# Patient Record
Sex: MALE | Race: WHITE | NOT HISPANIC OR LATINO | Employment: FULL TIME | URBAN - METROPOLITAN AREA
[De-identification: names, ages, dates, MRNs, and addresses within clinical notes are randomized per-mention and may not be internally consistent; named-entity substitution may affect disease eponyms.]

---

## 2017-06-29 ENCOUNTER — OFFICE VISIT (OUTPATIENT)
Dept: INTERNAL MEDICINE | Facility: CLINIC | Age: 52
End: 2017-06-29

## 2017-06-29 VITALS
DIASTOLIC BLOOD PRESSURE: 84 MMHG | TEMPERATURE: 98.6 F | BODY MASS INDEX: 28.1 KG/M2 | HEIGHT: 73 IN | HEART RATE: 51 BPM | OXYGEN SATURATION: 98 % | WEIGHT: 212 LBS | SYSTOLIC BLOOD PRESSURE: 124 MMHG

## 2017-06-29 DIAGNOSIS — J06.9 ACUTE URI: Primary | ICD-10-CM

## 2017-06-29 PROCEDURE — 99213 OFFICE O/P EST LOW 20 MIN: CPT | Performed by: NURSE PRACTITIONER

## 2017-06-29 RX ORDER — CEFDINIR 300 MG/1
300 CAPSULE ORAL 2 TIMES DAILY
Qty: 14 CAPSULE | Refills: 0 | Status: SHIPPED | OUTPATIENT
Start: 2017-06-29 | End: 2019-01-10 | Stop reason: SDUPTHER

## 2017-06-29 RX ORDER — GUAIFENESIN 600 MG/1
1200 TABLET, EXTENDED RELEASE ORAL 2 TIMES DAILY
Qty: 14 TABLET
Start: 2017-06-29 | End: 2019-01-10 | Stop reason: SDUPTHER

## 2017-06-29 RX ORDER — LORATADINE 10 MG/1
10 TABLET ORAL DAILY
Qty: 10 TABLET
Start: 2017-06-29 | End: 2019-01-10 | Stop reason: SDUPTHER

## 2017-06-29 NOTE — PROGRESS NOTES
Subjective   Amadeo Leary is a 51 y.o. male who presents due to respiratory symptoms.    URI    This is a new problem. The current episode started 1 to 4 weeks ago. The problem has been gradually worsening. There has been no fever. Associated symptoms include congestion, coughing, headaches, rhinorrhea and a sore throat. Pertinent negatives include no abdominal pain, chest pain, diarrhea, ear pain, nausea, neck pain, sneezing, swollen glands, vomiting or wheezing. Treatments tried: started Mucinex 600mg bid. The treatment provided mild relief.        The following portions of the patient's history were reviewed and updated as appropriate: allergies, current medications, past social history and problem list.    Past Medical History:   Diagnosis Date   • Seasonal allergies          Current Outpatient Prescriptions:   •  cefdinir (OMNICEF) 300 MG capsule, Take 1 capsule by mouth 2 (Two) Times a Day for 7 days., Disp: 14 capsule, Rfl: 0  •  guaiFENesin (MUCINEX) 600 MG 12 hr tablet, Take 2 tablets by mouth 2 (Two) Times a Day for 7 days., Disp: 14 tablet, Rfl:   •  loratadine (CLARITIN) 10 MG tablet, Take 1 tablet by mouth Daily for 10 days., Disp: 10 tablet, Rfl:     Allergies   Allergen Reactions   • Levaquin [Levofloxacin In D5w] Itching       Review of Systems   Constitutional: Negative for appetite change, chills, fatigue and fever.   HENT: Positive for congestion, rhinorrhea and sore throat. Negative for ear discharge, ear pain, facial swelling, hearing loss, postnasal drip, sinus pressure, sneezing and tinnitus.    Respiratory: Positive for cough. Negative for chest tightness, shortness of breath and wheezing.    Cardiovascular: Negative for chest pain, palpitations and leg swelling.   Gastrointestinal: Negative for abdominal pain, diarrhea, nausea and vomiting.   Musculoskeletal: Negative for neck pain and neck stiffness.   Neurological: Positive for headaches.   Hematological: Negative for adenopathy.  "      Objective   Vitals:    06/29/17 1517   BP: 124/84   BP Location: Left arm   Patient Position: Sitting   Cuff Size: Adult   Pulse: 51   Temp: 98.6 °F (37 °C)   TempSrc: Oral   SpO2: 98%   Weight: 212 lb (96.2 kg)   Height: 73\" (185.4 cm)     Physical Exam   Constitutional: He appears well-developed and well-nourished. He is cooperative. He does not have a sickly appearance. He does not appear ill.   HENT:   Head: Normocephalic.   Right Ear: Hearing, tympanic membrane and external ear normal. No drainage, swelling or tenderness. Tympanic membrane is not injected, not erythematous and not bulging. No middle ear effusion.   Left Ear: Hearing, tympanic membrane and external ear normal. No drainage, swelling or tenderness. Tympanic membrane is not injected, not erythematous and not bulging.  No middle ear effusion.   Nose: Nose normal. No mucosal edema, rhinorrhea or sinus tenderness. Right sinus exhibits no maxillary sinus tenderness and no frontal sinus tenderness. Left sinus exhibits no maxillary sinus tenderness and no frontal sinus tenderness.   Mouth/Throat: Mucous membranes are normal. Posterior oropharyngeal erythema present.   Eyes: Conjunctivae and lids are normal. Right eye exhibits no discharge and no exudate. Left eye exhibits no discharge and no exudate.   Neck: Trachea normal and normal range of motion. Edema present.   Cardiovascular: Regular rhythm, normal heart sounds and normal pulses.    No murmur heard.  Pulmonary/Chest: Breath sounds normal. No respiratory distress. He has no decreased breath sounds. He has no wheezes. He has no rhonchi. He has no rales.   Lymphadenopathy:     He has no cervical adenopathy.   Neurological: He is alert.   Skin: Skin is warm, dry and intact.   Nursing note and vitals reviewed.      Assessment/Plan   Amadeo was seen today for uri.    Diagnoses and all orders for this visit:    Acute URI  -     guaiFENesin (MUCINEX) 600 MG 12 hr tablet; Take 2 tablets by mouth 2 " (Two) Times a Day for 7 days.  -     loratadine (CLARITIN) 10 MG tablet; Take 1 tablet by mouth Daily for 10 days.  -     cefdinir (OMNICEF) 300 MG capsule; Take 1 capsule by mouth 2 (Two) Times a Day for 7 days.

## 2019-01-10 ENCOUNTER — OFFICE VISIT (OUTPATIENT)
Dept: INTERNAL MEDICINE | Facility: CLINIC | Age: 54
End: 2019-01-10

## 2019-01-10 VITALS
TEMPERATURE: 98.5 F | BODY MASS INDEX: 28.89 KG/M2 | HEART RATE: 60 BPM | OXYGEN SATURATION: 97 % | SYSTOLIC BLOOD PRESSURE: 110 MMHG | WEIGHT: 219 LBS | DIASTOLIC BLOOD PRESSURE: 78 MMHG

## 2019-01-10 DIAGNOSIS — J06.9 ACUTE URI: Primary | ICD-10-CM

## 2019-01-10 PROCEDURE — 99213 OFFICE O/P EST LOW 20 MIN: CPT | Performed by: NURSE PRACTITIONER

## 2019-01-10 RX ORDER — LORATADINE 10 MG/1
10 TABLET ORAL DAILY
Qty: 10 TABLET
Start: 2019-01-10 | End: 2019-01-20

## 2019-01-10 RX ORDER — CEFDINIR 300 MG/1
300 CAPSULE ORAL 2 TIMES DAILY
Qty: 14 CAPSULE | Refills: 0 | Status: SHIPPED | OUTPATIENT
Start: 2019-01-10 | End: 2019-01-17

## 2019-01-10 RX ORDER — GUAIFENESIN 600 MG/1
1200 TABLET, EXTENDED RELEASE ORAL 2 TIMES DAILY
Qty: 14 TABLET
Start: 2019-01-10 | End: 2019-01-17

## 2019-01-10 NOTE — PROGRESS NOTES
Subjective   Amadeo Leary is a 53 y.o. male who presents due to respiratory symptoms.    URI    This is a new problem. The current episode started in the past 7 days. The problem has been gradually worsening. Maximum temperature: felt feverish, ?low grade. Associated symptoms include congestion, coughing (productive of sputum), headaches, nausea, rhinorrhea, sneezing and a sore throat. Pertinent negatives include no abdominal pain, chest pain, diarrhea, dysuria, ear pain, vomiting or wheezing. Treatments tried: Mucinex DM, Nyquil. The treatment provided mild relief.        The following portions of the patient's history were reviewed and updated as appropriate: allergies, current medications, past social history and problem list.    Past Medical History:   Diagnosis Date   • Seasonal allergies          Current Outpatient Medications:   •  cefdinir (OMNICEF) 300 MG capsule, Take 1 capsule by mouth 2 (Two) Times a Day for 7 days., Disp: 14 capsule, Rfl: 0  •  guaiFENesin (MUCINEX) 600 MG 12 hr tablet, Take 2 tablets by mouth 2 (Two) Times a Day for 7 days., Disp: 14 tablet, Rfl:   •  loratadine (CLARITIN) 10 MG tablet, Take 1 tablet by mouth Daily for 10 days., Disp: 10 tablet, Rfl:     Allergies   Allergen Reactions   • Levaquin [Levofloxacin In D5w] Itching       Review of Systems   Constitutional: Positive for fatigue. Negative for activity change, appetite change, chills, fever and unexpected weight change.   HENT: Positive for congestion, postnasal drip, rhinorrhea, sinus pressure, sneezing and sore throat. Negative for drooling, ear pain, facial swelling, hearing loss, mouth sores, nosebleeds, trouble swallowing and voice change.    Eyes: Negative for pain, discharge, itching and visual disturbance.   Respiratory: Positive for cough (productive of sputum) and chest tightness. Negative for choking, shortness of breath and wheezing.    Cardiovascular: Negative for chest pain and palpitations.    Gastrointestinal: Positive for nausea. Negative for abdominal pain, constipation, diarrhea and vomiting.   Endocrine: Negative for polyuria.   Genitourinary: Negative for dysuria and frequency.   Musculoskeletal: Negative for back pain and joint swelling.   Neurological: Positive for headaches.       Objective   Vitals:    01/10/19 0827   BP: 110/78   BP Location: Left arm   Patient Position: Sitting   Cuff Size: Adult   Pulse: 60   Temp: 98.5 °F (36.9 °C)   TempSrc: Oral   SpO2: 97%   Weight: 99.3 kg (219 lb)     Physical Exam   Constitutional: He appears well-developed and well-nourished. He is cooperative. He does not have a sickly appearance. He does not appear ill.   HENT:   Head: Normocephalic.   Right Ear: Hearing, tympanic membrane and external ear normal. No drainage, swelling or tenderness. Tympanic membrane is not injected, not erythematous and not bulging. No middle ear effusion.   Left Ear: Hearing, tympanic membrane and external ear normal. No drainage, swelling or tenderness. Tympanic membrane is not injected, not erythematous and not bulging.  No middle ear effusion.   Nose: Nose normal. No mucosal edema, rhinorrhea or sinus tenderness. Right sinus exhibits no maxillary sinus tenderness and no frontal sinus tenderness. Left sinus exhibits no maxillary sinus tenderness and no frontal sinus tenderness.   Mouth/Throat: Mucous membranes are normal. Posterior oropharyngeal erythema present.   Eyes: Conjunctivae and lids are normal. Right eye exhibits no discharge and no exudate. Left eye exhibits no discharge and no exudate.   Neck: Trachea normal and normal range of motion. Edema present.   Cardiovascular: Regular rhythm, normal heart sounds and normal pulses.   No murmur heard.  Pulmonary/Chest: Breath sounds normal. No respiratory distress. He has no decreased breath sounds. He has no wheezes. He has no rhonchi. He has no rales.   Lymphadenopathy:     He has no cervical adenopathy.   Neurological: He  is alert.   Skin: Skin is warm, dry and intact.   Nursing note and vitals reviewed.      Assessment/Plan   Amadeo was seen today for uri.    Diagnoses and all orders for this visit:    Acute URI  -     cefdinir (OMNICEF) 300 MG capsule; Take 1 capsule by mouth 2 (Two) Times a Day for 7 days.  -     guaiFENesin (MUCINEX) 600 MG 12 hr tablet; Take 2 tablets by mouth 2 (Two) Times a Day for 7 days.  -     loratadine (CLARITIN) 10 MG tablet; Take 1 tablet by mouth Daily for 10 days.

## 2019-02-04 ENCOUNTER — TELEPHONE (OUTPATIENT)
Dept: INTERNAL MEDICINE | Facility: CLINIC | Age: 54
End: 2019-02-04

## 2019-02-04 RX ORDER — AZITHROMYCIN 250 MG/1
TABLET, FILM COATED ORAL
Qty: 6 TABLET | Refills: 0 | Status: SHIPPED | OUTPATIENT
Start: 2019-02-04 | End: 2019-03-18

## 2019-02-04 NOTE — TELEPHONE ENCOUNTER
Okay to call in Zpak #1 as directed with no refills, advised to f/u in clinic if sx persist/worsen. Thanks.

## 2019-02-04 NOTE — TELEPHONE ENCOUNTER
----- Message from Ethel Ojeda sent at 2/4/2019  9:08 AM EST -----  Contact: pt - Dr Houston' pt - RE: new Rx refills  Pt calling and would like a return call regarding seen a couple of weeks ago for Bronchitis. He informs antibiotic helped a couple of weeks ago but informs symptoms have returned. He would like to know if Rx for antibiotic could be called to pharmacy? Please advise. Thanks      [id number] [address] [place] [phone] [facility] [phone]     Pt # [phone]

## 2019-03-18 ENCOUNTER — OFFICE VISIT (OUTPATIENT)
Dept: INTERNAL MEDICINE | Facility: CLINIC | Age: 54
End: 2019-03-18

## 2019-03-18 VITALS
OXYGEN SATURATION: 98 % | WEIGHT: 219 LBS | HEART RATE: 57 BPM | BODY MASS INDEX: 28.89 KG/M2 | DIASTOLIC BLOOD PRESSURE: 74 MMHG | TEMPERATURE: 98.9 F | SYSTOLIC BLOOD PRESSURE: 100 MMHG

## 2019-03-18 DIAGNOSIS — R05.9 COUGH: ICD-10-CM

## 2019-03-18 DIAGNOSIS — J06.9 ACUTE URI: Primary | ICD-10-CM

## 2019-03-18 PROCEDURE — 99214 OFFICE O/P EST MOD 30 MIN: CPT | Performed by: NURSE PRACTITIONER

## 2019-03-18 PROCEDURE — 71046 X-RAY EXAM CHEST 2 VIEWS: CPT | Performed by: RADIOLOGY

## 2019-03-18 PROCEDURE — 71046 X-RAY EXAM CHEST 2 VIEWS: CPT | Performed by: NURSE PRACTITIONER

## 2019-03-18 RX ORDER — AZITHROMYCIN 250 MG/1
TABLET, FILM COATED ORAL
Qty: 6 TABLET | Refills: 0 | Status: SHIPPED | OUTPATIENT
Start: 2019-03-18 | End: 2019-03-24

## 2019-03-18 RX ORDER — CETIRIZINE HYDROCHLORIDE 10 MG/1
10 TABLET ORAL DAILY
Qty: 10 TABLET
Start: 2019-03-18 | End: 2019-03-28

## 2019-03-18 RX ORDER — GUAIFENESIN 600 MG/1
600 TABLET, EXTENDED RELEASE ORAL EVERY 12 HOURS SCHEDULED
Qty: 14 TABLET
Start: 2019-03-18 | End: 2019-03-25

## 2019-03-18 NOTE — PROGRESS NOTES
Subjective   Amadeo Leary is a 53 y.o. male who presents due to respiratory symptoms.    He had similar sx in January and February which improved with treatment, was feeling well until the past week when sx returned.      URI    This is a new problem. The current episode started in the past 7 days. The problem has been rapidly worsening. There has been no fever. Associated symptoms include congestion, coughing, headaches, rhinorrhea, sneezing and a sore throat. Pertinent negatives include no abdominal pain, chest pain, diarrhea, dysuria, ear pain, nausea, vomiting or wheezing. He has tried antihistamine (Mucinex, Zyrtec) for the symptoms. The treatment provided mild relief.        The following portions of the patient's history were reviewed and updated as appropriate: allergies, current medications, past social history and problem list.    Past Medical History:   Diagnosis Date   • Seasonal allergies          Current Outpatient Medications:   •  azithromycin (ZITHROMAX Z-ANDREWS) 250 MG tablet, Take 2 tablets the first day, then 1 tablet daily for 4 days., Disp: 6 tablet, Rfl: 0  •  cetirizine (zyrTEC) 10 MG tablet, Take 1 tablet by mouth Daily for 10 days., Disp: 10 tablet, Rfl:   •  guaiFENesin (MUCINEX) 600 MG 12 hr tablet, Take 1 tablet by mouth Every 12 (Twelve) Hours for 7 days., Disp: 14 tablet, Rfl:     Allergies   Allergen Reactions   • Levaquin [Levofloxacin In D5w] Itching       Review of Systems   Constitutional: Positive for fatigue. Negative for activity change, appetite change, chills, fever and unexpected weight change.   HENT: Positive for congestion, postnasal drip, rhinorrhea, sinus pressure, sneezing and sore throat. Negative for drooling, ear pain, facial swelling, hearing loss, mouth sores, nosebleeds, trouble swallowing and voice change.    Eyes: Negative for pain, discharge, itching and visual disturbance.   Respiratory: Positive for cough and chest tightness. Negative for choking, shortness  of breath and wheezing.    Cardiovascular: Negative for chest pain and palpitations.   Gastrointestinal: Negative for abdominal pain, constipation, diarrhea, nausea and vomiting.   Endocrine: Negative for polyuria.   Genitourinary: Negative for dysuria and frequency.   Musculoskeletal: Negative for back pain and joint swelling.   Neurological: Positive for headaches.       Objective   Vitals:    03/18/19 1524   BP: 100/74   BP Location: Left arm   Patient Position: Sitting   Cuff Size: Adult   Pulse: 57   Temp: 98.9 °F (37.2 °C)   TempSrc: Oral   SpO2: 98%   Weight: 99.3 kg (219 lb)     Physical Exam   Constitutional: He appears well-developed and well-nourished. He is cooperative. He does not have a sickly appearance. He does not appear ill.   HENT:   Head: Normocephalic.   Right Ear: Hearing, tympanic membrane and external ear normal. No drainage, swelling or tenderness. Tympanic membrane is not injected, not erythematous and not bulging. No middle ear effusion.   Left Ear: Hearing and external ear normal. No drainage, swelling or tenderness. Tympanic membrane is not injected, not erythematous and not bulging.  No middle ear effusion.   Nose: Nose normal. No mucosal edema, rhinorrhea or sinus tenderness. Right sinus exhibits no maxillary sinus tenderness and no frontal sinus tenderness. Left sinus exhibits no maxillary sinus tenderness and no frontal sinus tenderness.   Mouth/Throat: Mucous membranes are normal. Posterior oropharyngeal erythema present.   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 Right eye exhibits no discharge and no exudate. Left eye exhibits no discharge and no exudate.   Neck: Trachea normal and normal range of motion. Edema present.   Cardiovascular: Regular rhythm, normal heart sounds and normal pulses.   No murmur heard.  Pulmonary/Chest: Breath sounds normal. No respiratory distress. He has no decreased breath sounds. He has no wheezes. He has no rhonchi. He has  no rales.   Lymphadenopathy:     He has no cervical adenopathy.   Neurological: He is alert.   Skin: Skin is warm, dry and intact.   Nursing note and vitals reviewed.      Assessment/Plan   Amadeo was seen today for uri.    Diagnoses and all orders for this visit:    Acute URI  -     azithromycin (ZITHROMAX Z-ANDREWS) 250 MG tablet; Take 2 tablets the first day, then 1 tablet daily for 4 days.  -     cetirizine (zyrTEC) 10 MG tablet; Take 1 tablet by mouth Daily for 10 days.  -     guaiFENesin (MUCINEX) 600 MG 12 hr tablet; Take 1 tablet by mouth Every 12 (Twelve) Hours for 7 days.    Cough  -     XR Chest 2 View    No acute abnl noted on CXR-will send to radiology for review. 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 Consider further evaluation if sx continue to recur (takes Zyrtec daily as needed).

## 2019-08-21 ENCOUNTER — OFFICE VISIT (OUTPATIENT)
Dept: INTERNAL MEDICINE | Facility: CLINIC | Age: 54
End: 2019-08-21

## 2019-08-21 VITALS
DIASTOLIC BLOOD PRESSURE: 68 MMHG | HEIGHT: 72 IN | HEART RATE: 52 BPM | TEMPERATURE: 98.3 F | WEIGHT: 223.6 LBS | OXYGEN SATURATION: 99 % | BODY MASS INDEX: 30.28 KG/M2 | SYSTOLIC BLOOD PRESSURE: 108 MMHG

## 2019-08-21 DIAGNOSIS — H57.12 OCULAR PAIN, LEFT EYE: ICD-10-CM

## 2019-08-21 DIAGNOSIS — H65.02 ACUTE SEROUS OTITIS MEDIA OF LEFT EAR, RECURRENCE NOT SPECIFIED: Primary | ICD-10-CM

## 2019-08-21 PROCEDURE — 99213 OFFICE O/P EST LOW 20 MIN: CPT | Performed by: NURSE PRACTITIONER

## 2019-08-21 RX ORDER — CEFDINIR 300 MG/1
300 CAPSULE ORAL 2 TIMES DAILY
Qty: 20 CAPSULE | Refills: 0 | Status: SHIPPED | OUTPATIENT
Start: 2019-08-21 | End: 2019-08-31

## 2019-08-21 NOTE — PATIENT INSTRUCTIONS

## 2019-08-21 NOTE — PROGRESS NOTES
Subjective   Amadeo Leary is a 53 y.o. male.     He has had history of ear tube (left only). He was cleaning around the house.       Earache    There is pain in the left ear. This is a new problem. The problem has been unchanged. There has been no fever. Pain scale: stabbing, intermittent, worst 5/10. The pain is moderate. Associated symptoms include hearing loss (chornic ) and a sore throat. Pertinent negatives include no abdominal pain, coughing, diarrhea, ear discharge, headaches, rhinorrhea or vomiting. Treatments tried: amoxicillin (2 days), ibuprofen  The treatment provided mild relief. His past medical history is significant for a chronic ear infection, hearing loss and a tympanostomy tube.        The following portions of the patient's history were reviewed and updated as appropriate: allergies, current medications, past family history, past medical history, past social history, past surgical history and problem list.    Review of Systems   Constitutional: Negative for appetite change, chills, fatigue and fever.   HENT: Positive for ear pain, hearing loss (chornic ) and sore throat. Negative for congestion, ear discharge, facial swelling, postnasal drip, rhinorrhea, sinus pressure, sneezing and tinnitus.    Eyes: Positive for pain (improved, had monday after putting  contact solution in eye ) and discharge (clear ). Negative for photophobia, redness, itching and visual disturbance.   Respiratory: Negative for cough, chest tightness, shortness of breath and wheezing.    Cardiovascular: Negative for chest pain, palpitations and leg swelling.   Gastrointestinal: Negative for abdominal pain, diarrhea, nausea and vomiting.   Allergic/Immunologic: Negative for environmental allergies.   Neurological: Negative for headaches.   Hematological: Negative for adenopathy.       Objective   Physical Exam   Constitutional: He appears well-developed and well-nourished. He is cooperative. He does not have a sickly  appearance. He does not appear ill.   HENT:   Head: Normocephalic.   Right Ear: Hearing, tympanic membrane and external ear normal. No drainage, swelling or tenderness. No mastoid tenderness. Tympanic membrane is not injected, not scarred, not erythematous and not bulging. Tympanic membrane mobility is normal. No middle ear effusion. No decreased hearing is noted.   Left Ear: Hearing and external ear normal. No drainage, swelling or tenderness. Tympanic membrane is perforated, erythematous and bulging. A middle ear effusion is present. No decreased hearing is noted.   Nose: Nose normal. No mucosal edema, rhinorrhea, sinus tenderness or nasal deformity. Right sinus exhibits no maxillary sinus tenderness and no frontal sinus tenderness. Left sinus exhibits no maxillary sinus tenderness and no frontal sinus tenderness.   Mouth/Throat: Oropharynx is clear and moist and mucous membranes are normal. Normal dentition. No tonsillar exudate.   Eyes: Conjunctivae, EOM and lids are normal. Pupils are equal, round, and reactive to light. Right eye exhibits no discharge and no exudate. Left eye exhibits no discharge and no exudate.   Neck: Trachea normal and normal range of motion. No edema present. No thyroid mass and no thyromegaly present.   Cardiovascular: Regular rhythm, normal heart sounds and normal pulses.   No murmur heard.  Pulmonary/Chest: Breath sounds normal. No respiratory distress. He has no decreased breath sounds. He has no wheezes. He has no rhonchi. He has no rales.   Lymphadenopathy:        Head (right side): No submental, no submandibular, no tonsillar, no preauricular, no posterior auricular and no occipital adenopathy present.        Head (left side): No submental, no submandibular, no tonsillar, no preauricular, no posterior auricular and no occipital adenopathy present.     He has no cervical adenopathy.   Neurological: He is alert.   Skin: Skin is warm, dry and intact. No cyanosis. Nails show no  clubbing.       Assessment/Plan   Amadeo was seen today for earache and eye pain.    Diagnoses and all orders for this visit:    Acute serous otitis media of left ear, recurrence not specified  -     cefdinir (OMNICEF) 300 MG capsule; Take 1 capsule by mouth 2 (Two) Times a Day for 10 days.    Ocular pain, left eye  Comments:  he will see opthalmologist, wear glasses

## 2019-11-12 ENCOUNTER — OFFICE VISIT (OUTPATIENT)
Dept: INTERNAL MEDICINE | Facility: CLINIC | Age: 54
End: 2019-11-12

## 2019-11-12 VITALS
HEART RATE: 58 BPM | HEIGHT: 72 IN | TEMPERATURE: 98.2 F | DIASTOLIC BLOOD PRESSURE: 72 MMHG | WEIGHT: 223 LBS | OXYGEN SATURATION: 99 % | SYSTOLIC BLOOD PRESSURE: 116 MMHG | BODY MASS INDEX: 30.2 KG/M2

## 2019-11-12 DIAGNOSIS — R05.9 COUGH: Primary | ICD-10-CM

## 2019-11-12 PROCEDURE — 99213 OFFICE O/P EST LOW 20 MIN: CPT | Performed by: NURSE PRACTITIONER

## 2019-11-12 RX ORDER — BENZONATATE 200 MG/1
200 CAPSULE ORAL 3 TIMES DAILY PRN
Qty: 30 CAPSULE | Refills: 0 | Status: SHIPPED | OUTPATIENT
Start: 2019-11-12 | End: 2020-01-24

## 2019-11-12 NOTE — PROGRESS NOTES
"Subjective     Amadeo Leary is a 54 y.o. male.         Patient presents with:  Bronchitis: cough, congestion drainage    PMH of PNA      Bronchitis   This is a new problem. The current episode started 1 to 4 weeks ago. The problem occurs constantly. The problem has been unchanged. Associated symptoms include coughing. Pertinent negatives include no abdominal pain, chest pain, congestion, fever, nausea, sore throat or vomiting. Nothing aggravates the symptoms. Treatments tried: mucinex, delsym.        The following portions of the patient's history were reviewed and updated as appropriate: allergies, current medications, past social history and problem list.    Review of Systems   Constitutional: Negative for fever.   HENT: Positive for postnasal drip. Negative for congestion, rhinorrhea, sinus pressure, sinus pain, sneezing and sore throat.    Respiratory: Positive for cough. Negative for shortness of breath and wheezing.    Cardiovascular: Negative for chest pain.   Gastrointestinal: Negative for abdominal pain, nausea and vomiting.   Psychiatric/Behavioral: Negative for sleep disturbance.       Objective     /72   Pulse 58   Temp 98.2 °F (36.8 °C)   Ht 182.9 cm (72\")   Wt 101 kg (223 lb)   SpO2 99%   BMI 30.24 kg/m²     Physical Exam   Constitutional: He appears well-developed and well-nourished. No distress.   HENT:   Head: Normocephalic and atraumatic.   Nose: No mucosal edema or rhinorrhea.   Mouth/Throat: Uvula is midline and mucous membranes are normal. No oropharyngeal exudate or posterior oropharyngeal erythema (clear hypersecretions noted). No tonsillar exudate.   Eyes: Conjunctivae are normal. Right eye exhibits no discharge. Left eye exhibits no discharge.   Cardiovascular: Normal rate, regular rhythm and normal heart sounds.   No murmur heard.  Pulmonary/Chest: Effort normal and breath sounds normal. No tachypnea. He has no decreased breath sounds. He has no wheezes. He has no rhonchi. "   Lymphadenopathy:        Head (right side): No submental, no submandibular and no tonsillar adenopathy present.        Head (left side): No submental, no submandibular and no tonsillar adenopathy present.   Neurological: He is alert.   Skin: He is not diaphoretic.   Psychiatric: He has a normal mood and affect.   Vitals reviewed.      Assessment/Plan     Amadeo was seen today for bronchitis.    Diagnoses and all orders for this visit:    Cough  -     benzonatate (TESSALON) 200 MG capsule; Take 1 capsule by mouth 3 (Three) Times a Day As Needed for Cough.    He will add antihistamine and Flonase.    Increase fluid intake and rest.    Return for worsening of sx.

## 2020-01-24 ENCOUNTER — OFFICE VISIT (OUTPATIENT)
Dept: INTERNAL MEDICINE | Facility: CLINIC | Age: 55
End: 2020-01-24

## 2020-01-24 VITALS
HEART RATE: 64 BPM | BODY MASS INDEX: 30.2 KG/M2 | SYSTOLIC BLOOD PRESSURE: 100 MMHG | WEIGHT: 223 LBS | DIASTOLIC BLOOD PRESSURE: 70 MMHG | TEMPERATURE: 98.7 F | OXYGEN SATURATION: 98 % | HEIGHT: 72 IN

## 2020-01-24 DIAGNOSIS — J06.9 ACUTE URI: Primary | ICD-10-CM

## 2020-01-24 DIAGNOSIS — M25.512 ACUTE PAIN OF LEFT SHOULDER: ICD-10-CM

## 2020-01-24 PROCEDURE — 99214 OFFICE O/P EST MOD 30 MIN: CPT | Performed by: NURSE PRACTITIONER

## 2020-01-24 RX ORDER — GUAIFENESIN 600 MG/1
600 TABLET, EXTENDED RELEASE ORAL EVERY 12 HOURS SCHEDULED
Qty: 14 TABLET
Start: 2020-01-24 | End: 2020-01-31

## 2020-01-24 RX ORDER — LORATADINE 10 MG/1
10 TABLET ORAL DAILY
Qty: 10 TABLET
Start: 2020-01-24 | End: 2020-02-03

## 2020-01-24 RX ORDER — AZITHROMYCIN 250 MG/1
TABLET, FILM COATED ORAL
Qty: 6 TABLET | Refills: 0 | Status: SHIPPED | OUTPATIENT
Start: 2020-01-24 | End: 2020-01-30

## 2020-01-25 NOTE — PROGRESS NOTES
Subjective   Amadeo Leary is a 54 y.o. male who presents due to respiratory symptoms and due to left shoulder pain.    He also c/o left shoulder pain without acute injury or trauma. He has noticed increased tightness and decreased range of motion with lifting weight in the gym, denies paresthesias of left arm.     URI    This is a new problem. The current episode started 1 to 4 weeks ago (tx in 11/2019 for viral infection which resolved, sx returned jennifer 3 weeks ago). The problem has been gradually worsening. There has been no fever. Associated symptoms include congestion, coughing, headaches, rhinorrhea, sinus pain, sneezing and a sore throat. Pertinent negatives include no abdominal pain, chest pain, diarrhea, dysuria, ear pain, nausea, vomiting or wheezing. He has tried antihistamine for the symptoms. The treatment provided mild relief.        The following portions of the patient's history were reviewed and updated as appropriate: allergies, current medications, past social history and problem list.    Past Medical History:   Diagnosis Date   • Seasonal allergies          Current Outpatient Medications:   •  azithromycin (ZITHROMAX Z-ANDREWS) 250 MG tablet, Take 2 tablets the first day, then 1 tablet daily for 4 days., Disp: 6 tablet, Rfl: 0  •  guaiFENesin (MUCINEX) 600 MG 12 hr tablet, Take 1 tablet by mouth Every 12 (Twelve) Hours for 7 days., Disp: 14 tablet, Rfl:   •  loratadine (CLARITIN) 10 MG tablet, Take 1 tablet by mouth Daily for 10 days., Disp: 10 tablet, Rfl:     Allergies   Allergen Reactions   • Levaquin [Levofloxacin In D5w] Itching       Review of Systems   Constitutional: Positive for fatigue. Negative for activity change, appetite change, chills, fever and unexpected weight change.   HENT: Positive for congestion, postnasal drip, rhinorrhea, sinus pressure, sinus pain, sneezing and sore throat. Negative for drooling, ear pain, facial swelling, hearing loss, mouth sores, nosebleeds, trouble  "swallowing and voice change.    Eyes: Negative for pain, discharge, itching and visual disturbance.   Respiratory: Positive for cough and chest tightness. Negative for choking, shortness of breath and wheezing.    Cardiovascular: Negative for chest pain and palpitations.   Gastrointestinal: Negative for abdominal pain, constipation, diarrhea, nausea and vomiting.   Endocrine: Negative for polyuria.   Genitourinary: Negative for dysuria and frequency.   Musculoskeletal: Positive for arthralgias. Negative for back pain and joint swelling.   Neurological: Positive for headaches.       Objective   Vitals:    01/24/20 0820   BP: 100/70   BP Location: Left arm   Patient Position: Sitting   Cuff Size: Adult   Pulse: 64   Temp: 98.7 °F (37.1 °C)   TempSrc: Oral   SpO2: 98%   Weight: 101 kg (223 lb)   Height: 182.9 cm (72\")     Body mass index is 30.24 kg/m².  Physical Exam   Constitutional: He appears well-developed and well-nourished. He is cooperative. He does not have a sickly appearance. He does not appear ill.   HENT:   Head: Normocephalic.   Right Ear: Hearing, tympanic membrane and external ear normal. No drainage, swelling or tenderness. Tympanic membrane is not injected, not erythematous and not bulging. No middle ear effusion.   Left Ear: Hearing, tympanic membrane and external ear normal. No drainage, swelling or tenderness. Tympanic membrane is not injected, not erythematous and not bulging.  No middle ear effusion.   Nose: Nose normal. No mucosal edema, rhinorrhea or sinus tenderness. Right sinus exhibits no maxillary sinus tenderness and no frontal sinus tenderness. Left sinus exhibits no maxillary sinus tenderness and no frontal sinus tenderness.   Mouth/Throat: Mucous membranes are normal. Posterior oropharyngeal erythema present.   Eyes: Conjunctivae and lids are normal. Right eye exhibits no discharge and no exudate. Left eye exhibits no discharge and no exudate.   Neck: Trachea normal and normal range of " motion. Edema present.   Cardiovascular: Regular rhythm, normal heart sounds and normal pulses.   No murmur heard.  Pulmonary/Chest: Breath sounds normal. No respiratory distress. He has no decreased breath sounds. He has no wheezes. He has no rhonchi. He has no rales.   Musculoskeletal:        Left shoulder: He exhibits no tenderness.   Increased pain with internal and external rotation of the left arm   Lymphadenopathy:     He has no cervical adenopathy.   Neurological: He is alert.   Skin: Skin is warm, dry and intact.   Nursing note and vitals reviewed.      Assessment/Plan   Amadeo was seen today for uri and shoulder pain.    Diagnoses and all orders for this visit:    Acute URI  -     guaiFENesin (MUCINEX) 600 MG 12 hr tablet; Take 1 tablet by mouth Every 12 (Twelve) Hours for 7 days.  -     loratadine (CLARITIN) 10 MG tablet; Take 1 tablet by mouth Daily for 10 days.  -     azithromycin (ZITHROMAX Z-ANDREWS) 250 MG tablet; Take 2 tablets the first day, then 1 tablet daily for 4 days.    Acute pain of left shoulder  -     Ambulatory Referral to Physical Therapy Evaluate and treat    1. Start Claritin and Mucinex for increased drainage.  2. Sx suggestive of tendonitis, start PT and continue to monitor. May take Ibuprofen as needed for pain.

## 2020-08-17 ENCOUNTER — TRANSCRIBE ORDERS (OUTPATIENT)
Dept: ADMINISTRATIVE | Facility: HOSPITAL | Age: 55
End: 2020-08-17

## 2020-08-17 ENCOUNTER — LAB (OUTPATIENT)
Dept: LAB | Facility: HOSPITAL | Age: 55
End: 2020-08-17

## 2020-08-17 DIAGNOSIS — M25.512 LEFT SHOULDER PAIN, UNSPECIFIED CHRONICITY: ICD-10-CM

## 2020-08-17 DIAGNOSIS — Z01.818 PRE-OP TESTING: ICD-10-CM

## 2020-08-17 DIAGNOSIS — Z01.818 PRE-OP TESTING: Primary | ICD-10-CM

## 2020-08-17 LAB
ALBUMIN SERPL-MCNC: 4.3 G/DL (ref 3.5–5.2)
ALBUMIN/GLOB SERPL: 1.7 G/DL
ALP SERPL-CCNC: 47 U/L (ref 39–117)
ALT SERPL W P-5'-P-CCNC: 47 U/L (ref 1–41)
ANION GAP SERPL CALCULATED.3IONS-SCNC: 11.8 MMOL/L (ref 5–15)
AST SERPL-CCNC: 26 U/L (ref 1–40)
BILIRUB SERPL-MCNC: 0.3 MG/DL (ref 0–1.2)
BUN SERPL-MCNC: 24 MG/DL (ref 6–20)
BUN/CREAT SERPL: 19.4 (ref 7–25)
CALCIUM SPEC-SCNC: 9.5 MG/DL (ref 8.6–10.5)
CHLORIDE SERPL-SCNC: 102 MMOL/L (ref 98–107)
CO2 SERPL-SCNC: 26.2 MMOL/L (ref 22–29)
CREAT SERPL-MCNC: 1.24 MG/DL (ref 0.76–1.27)
DEPRECATED RDW RBC AUTO: 40.3 FL (ref 37–54)
ERYTHROCYTE [DISTWIDTH] IN BLOOD BY AUTOMATED COUNT: 12.5 % (ref 12.3–15.4)
GFR SERPL CREATININE-BSD FRML MDRD: 61 ML/MIN/1.73
GLOBULIN UR ELPH-MCNC: 2.5 GM/DL
GLUCOSE SERPL-MCNC: 107 MG/DL (ref 65–99)
HCT VFR BLD AUTO: 45.3 % (ref 37.5–51)
HGB BLD-MCNC: 15.4 G/DL (ref 13–17.7)
MCH RBC QN AUTO: 29.9 PG (ref 26.6–33)
MCHC RBC AUTO-ENTMCNC: 34 G/DL (ref 31.5–35.7)
MCV RBC AUTO: 88 FL (ref 79–97)
PLATELET # BLD AUTO: 188 10*3/MM3 (ref 140–450)
PMV BLD AUTO: 10.8 FL (ref 6–12)
POTASSIUM SERPL-SCNC: 4.3 MMOL/L (ref 3.5–5.2)
PROT SERPL-MCNC: 6.8 G/DL (ref 6–8.5)
RBC # BLD AUTO: 5.15 10*6/MM3 (ref 4.14–5.8)
SODIUM SERPL-SCNC: 140 MMOL/L (ref 136–145)
WBC # BLD AUTO: 5.98 10*3/MM3 (ref 3.4–10.8)

## 2020-08-17 PROCEDURE — 85027 COMPLETE CBC AUTOMATED: CPT

## 2020-08-17 PROCEDURE — 80053 COMPREHEN METABOLIC PANEL: CPT

## 2020-08-17 PROCEDURE — 36415 COLL VENOUS BLD VENIPUNCTURE: CPT

## 2021-03-01 ENCOUNTER — OFFICE VISIT (OUTPATIENT)
Dept: INTERNAL MEDICINE | Facility: CLINIC | Age: 56
End: 2021-03-01

## 2021-03-01 VITALS
TEMPERATURE: 98.8 F | SYSTOLIC BLOOD PRESSURE: 127 MMHG | HEIGHT: 72 IN | BODY MASS INDEX: 31.56 KG/M2 | DIASTOLIC BLOOD PRESSURE: 79 MMHG | RESPIRATION RATE: 18 BRPM | WEIGHT: 233 LBS | OXYGEN SATURATION: 95 % | HEART RATE: 55 BPM

## 2021-03-01 DIAGNOSIS — H65.05 RECURRENT ACUTE SEROUS OTITIS MEDIA OF LEFT EAR: ICD-10-CM

## 2021-03-01 DIAGNOSIS — H92.02 LEFT EAR PAIN: Primary | ICD-10-CM

## 2021-03-01 PROCEDURE — 99213 OFFICE O/P EST LOW 20 MIN: CPT | Performed by: NURSE PRACTITIONER

## 2021-03-01 RX ORDER — AZITHROMYCIN 250 MG/1
TABLET, FILM COATED ORAL
Qty: 6 TABLET | Refills: 0 | Status: SHIPPED | OUTPATIENT
Start: 2021-03-01 | End: 2021-07-14

## 2021-03-01 RX ORDER — AMOXICILLIN 875 MG/1
TABLET, COATED ORAL
COMMUNITY
Start: 2021-02-22 | End: 2021-03-01

## 2021-03-01 NOTE — PROGRESS NOTES
Subjective   Amadeo Leary is a 55 y.o. male.     No known exposure to covid 19. He has history of recurrent left ear infections. He had tubes as a child and has perforated ear drum. He called his ENT for appt and unable to be seen until May. He reports last Sunday he was prescribed Amoxicillin by his brother in law. He reports no improvement in ear pain. He is also having intermittent left side headache. No rash. He denies any discharge from ear.    Earache   There is pain in the left ear. This is a new problem. The current episode started 1 to 4 weeks ago. The problem has been unchanged. There has been no fever. Associated symptoms include headaches, rhinorrhea and a sore throat. Pertinent negatives include no abdominal pain, coughing, diarrhea, ear discharge, hearing loss or neck pain. He has tried antibiotics (started antibiotic about last SUnday, zyrtec, flonase ) for the symptoms. The treatment provided mild relief.        The following portions of the patient's history were reviewed and updated as appropriate: allergies, current medications, past social history and problem list.    Review of Systems   HENT: Positive for ear pain, rhinorrhea and sore throat. Negative for ear discharge and hearing loss.    Respiratory: Negative for cough.    Gastrointestinal: Negative for abdominal pain and diarrhea.   Musculoskeletal: Negative for neck pain.   Neurological: Positive for headaches.       Objective   Physical Exam  Constitutional:       Appearance: He is well-developed.   HENT:      Head: Normocephalic.      Right Ear: Hearing, tympanic membrane, ear canal and external ear normal.      Left Ear: Hearing, ear canal and external ear normal. Tympanic membrane is injected, perforated and erythematous.      Nose: Nose normal.   Cardiovascular:      Rate and Rhythm: Regular rhythm.      Heart sounds: Normal heart sounds. No murmur. No S3 or S4 sounds.    Pulmonary:      Effort: Pulmonary effort is normal.       Breath sounds: Normal breath sounds. No decreased breath sounds, wheezing, rhonchi or rales.   Skin:     General: Skin is warm and dry.   Neurological:      Mental Status: He is alert and oriented to person, place, and time.      Gait: Gait normal.   Psychiatric:         Mood and Affect: Mood and affect normal.         Speech: Speech normal.         Behavior: Behavior normal.         Thought Content: Thought content normal.         Cognition and Memory: Cognition normal.         Judgment: Judgment normal.         Assessment/Plan   Diagnoses and all orders for this visit:    1. Left ear pain (Primary)    2. Recurrent acute serous otitis media of left ear  -     azithromycin (Zithromax Z-Jerrell) 250 MG tablet; Take 2 tablets the first day, then 1 tablet daily for 4 days.  Dispense: 6 tablet; Refill: 0    he will return for any worsening of symptoms.

## 2021-03-30 ENCOUNTER — BULK ORDERING (OUTPATIENT)
Dept: CASE MANAGEMENT | Facility: OTHER | Age: 56
End: 2021-03-30

## 2021-03-30 DIAGNOSIS — Z23 IMMUNIZATION DUE: ICD-10-CM

## 2021-06-04 ENCOUNTER — TELEPHONE (OUTPATIENT)
Dept: PEDIATRICS | Facility: OTHER | Age: 56
End: 2021-06-04

## 2021-06-04 NOTE — TELEPHONE ENCOUNTER
Caller: Amadeo Leary    Relationship: Self    Best call back number: [phone]    What is the best time to reach you:     Who are you requesting to speak with (clinical staff, provider,  specific staff member):NURSE OR PHYSICIAN      What was the call regarding: PATIENT STATES EARS ARE STOPPED UP. CAN'T HEAR OUT OF EITHER ONE. NOT SURE IF IT'S ALLERGIES OR IF THEY HAVE BUILT UP WAX IN THEM. PLEASE ADVISE.      Do you require a callback:YES.

## 2021-07-14 ENCOUNTER — OFFICE VISIT (OUTPATIENT)
Dept: INTERNAL MEDICINE | Facility: CLINIC | Age: 56
End: 2021-07-14

## 2021-07-14 VITALS
TEMPERATURE: 98 F | HEIGHT: 72 IN | HEART RATE: 56 BPM | DIASTOLIC BLOOD PRESSURE: 60 MMHG | OXYGEN SATURATION: 97 % | SYSTOLIC BLOOD PRESSURE: 100 MMHG | RESPIRATION RATE: 16 BRPM | WEIGHT: 230.6 LBS | BODY MASS INDEX: 31.23 KG/M2

## 2021-07-14 DIAGNOSIS — Z11.59 NEED FOR HEPATITIS C SCREENING TEST: ICD-10-CM

## 2021-07-14 DIAGNOSIS — D12.6 TUBULAR ADENOMA OF COLON: ICD-10-CM

## 2021-07-14 DIAGNOSIS — Z13.6 SCREENING FOR HYPERTENSION: ICD-10-CM

## 2021-07-14 DIAGNOSIS — Z13.220 ENCOUNTER FOR LIPID SCREENING FOR CARDIOVASCULAR DISEASE: ICD-10-CM

## 2021-07-14 DIAGNOSIS — Z23 NEED FOR VACCINATION: ICD-10-CM

## 2021-07-14 DIAGNOSIS — Z00.00 ANNUAL PHYSICAL EXAM: Primary | ICD-10-CM

## 2021-07-14 DIAGNOSIS — Z12.11 SCREEN FOR COLON CANCER: ICD-10-CM

## 2021-07-14 DIAGNOSIS — Z13.6 ENCOUNTER FOR LIPID SCREENING FOR CARDIOVASCULAR DISEASE: ICD-10-CM

## 2021-07-14 DIAGNOSIS — Z12.5 SPECIAL SCREENING, PROSTATE CANCER: ICD-10-CM

## 2021-07-14 PROCEDURE — 99396 PREV VISIT EST AGE 40-64: CPT | Performed by: NURSE PRACTITIONER

## 2021-07-14 PROCEDURE — 90750 HZV VACC RECOMBINANT IM: CPT | Performed by: NURSE PRACTITIONER

## 2021-07-14 NOTE — PROGRESS NOTES
"        Chief Complaint  Annual Exam       Subjective:      History of Present Illness {CC  Problem List  Visit  Diagnosis   Encounters  Notes  Medications  Labs  Result Review Imaging  Media :23}     Amadeo Leary is a patient of Debbie Houston MD and presents to [facility] for annual exam.     He is new to me.   Prior records reviewed.     No new concerns.     Amadeo is here for coordination of medical care, to discuss health maintenance, disease prevention as well as to followup on medical problems.     Patient Care Team:  Debbie Houston MD as PCP - General (Internal Medicine)     He states that his activity level is heavy. Exercises 5 per week. his diet is in general, a \"healthy\" diet  . Appetite is good.     Weight trend is stable.     Wt Readings from Last 4 Encounters:   07/14/21 105 kg (230 lb 9.6 oz)   03/01/21 106 kg (233 lb)   01/24/20 101 kg (223 lb)   11/12/19 101 kg (223 lb)         Feels well with few complaints. Energy level is good. Sleeps fairly well.      Depression screen: PHQ-2 Total Score: 1    Not affecting life, exercise helps     Health Maintenance Male:    · He voids without difficulty - although stream is not as strong as when he was younger.     PSA was discussed and ordered He has no increased risk of prostate cancer    Patient's last colonoscopy was 8/26/2016.  Tubular adenoma with low grade dysplasia   He is advised to repeat in 5 years.  Prior with Syed Robles who has retired.  Will need to order with new provider.      Vaccines: Due for shingles: he will get first dose today and come back in 2-6 months for 2nd.      Last eye exam:  Last few weeks   Should wear sunglasses when outside.     He does see his dentist regularly.    Regular Sunsceen: advised    His cardiovascular risks are:    [] No Known risk factors    [] Hypertension   [] Hyperlipidemia  [] Diabetes    [] Obesity  [x] Family history   [] Current or hx tobacco use  [] Sedentary " lifestyle   [] Testosterone use       Works for [employer]      Objective:      Physical Exam  Vitals reviewed.   Constitutional:       Appearance: Normal appearance. He is well-developed.   HENT:      Head: Normocephalic and atraumatic.      Right Ear: External ear normal.      Left Ear: External ear normal.      Nose: Nose normal.   Eyes:      Conjunctiva/sclera: Conjunctivae normal.      Pupils: Pupils are equal, round, and reactive to light.   Neck:      Thyroid: No thyromegaly.      Vascular: No JVD.   Cardiovascular:      Rate and Rhythm: Normal rate and regular rhythm.      Pulses: Normal pulses.           Radial pulses are 2+ on the right side and 2+ on the left side.      Heart sounds: Normal heart sounds, S1 normal and S2 normal. No murmur heard.   No friction rub. No gallop.    Pulmonary:      Effort: Pulmonary effort is normal.      Breath sounds: Normal breath sounds.   Chest:      Chest wall: No deformity.   Abdominal:      General: Bowel sounds are normal.      Palpations: Abdomen is soft.      Tenderness: There is no abdominal tenderness. Negative signs include Rea's sign.      Hernia: No hernia is present. There is no hernia in the left inguinal area (very mild inguinal hernia - not causing him issues at this time, monitor) or right inguinal area.   Genitourinary:     Penis: Normal and circumcised.       Testes: Normal. Cremasteric reflex is present.   Musculoskeletal:         General: Normal range of motion.      Cervical back: Normal range of motion and neck supple.      Right lower leg: No edema.      Left lower leg: No edema.   Lymphadenopathy:      Cervical: No cervical adenopathy.   Skin:     General: Skin is warm and dry.      Capillary Refill: Capillary refill takes 2 to 3 seconds.      Nails: There is no clubbing.   Neurological:      Mental Status: He is alert and oriented to person, place, and time.      Cranial Nerves: No cranial nerve deficit.      Sensory: No sensory deficit.       "Motor: Motor function is intact. No weakness.      Gait: Gait normal.   Psychiatric:         Mood and Affect: Mood normal.         Speech: Speech normal.         Behavior: Behavior normal. Behavior is cooperative.         Thought Content: Thought content normal.         Judgment: Judgment normal.        Result Review  Data Reviewed:{ Labs  Result Review  Imaging  Med Tab  Media :23}   The following data was reviewed by: Jermaine Quijano III, NP-C on 07/14/2021  Lab Results - Last 18 Months   Lab Units 08/17/20  0841   BUN mg/dL 24*   CREATININE mg/dL 1.24   EGFR IF NONAFRICN AM mL/min/1.73 61   SODIUM mmol/L 140   POTASSIUM mmol/L 4.3   CHLORIDE mmol/L 102   CALCIUM mg/dL 9.5   ALBUMIN g/dL 4.30   BILIRUBIN mg/dL 0.3   ALK PHOS U/L 47   AST (SGOT) U/L 26   ALT (SGPT) U/L 47*   WBC 10*3/mm3 5.98   RBC 10*6/mm3 5.15   HEMATOCRIT % 45.3   MCV fL 88.0   MCH pg 29.9        Vital Signs:   /60 (BP Location: Right arm, Patient Position: Sitting, Cuff Size: Adult)   Pulse 56   Temp 98 °F (36.7 °C) (Temporal)   Resp 16   Ht 182.9 cm (72\")   Wt 105 kg (230 lb 9.6 oz)   SpO2 97%   BMI 31.27 kg/m²          Assessment and Plan:      Assessment and Plan {CC Problem List  Visit Diagnosis  ROS  Review (Popup)  Health Maintenance  Quality  BestPractice  Medications  SmartSets  SnapShot Encounters  Media :23}     Problem List Items Addressed This Visit        Gastrointestinal Abdominal     Tubular adenoma of colon (Chronic)    Overview     8/2016         Current Assessment & Plan     Due for repeat Colonoscopy          Relevant Orders    Ambulatory Referral For Screening Colonoscopy      Other Visit Diagnoses     Annual physical exam    -  Primary    Relevant Orders    Comprehensive Metabolic Panel    Lipid Panel With LDL / HDL Ratio    CBC (No Diff)    PSA SCREENING    Need for hepatitis C screening test        Relevant Orders    Hepatitis C Antibody    Special screening, prostate cancer        " Relevant Orders    PSA SCREENING    Encounter for lipid screening for cardiovascular disease        Relevant Orders    Lipid Panel With LDL / HDL Ratio    Screen for colon cancer        Relevant Orders    Ambulatory Referral For Screening Colonoscopy    Need for vaccination        Relevant Orders    Shingrix Vaccine (Completed)    Screening for hypertension        BP controlled - continue low salt diet, routine exercise.           Follow Up {Instructions Charge Capture  Follow-up Communications :23}     Return in about 1 year (around 7/14/2022) for Annual physical.  Patient was given instructions and counseling regarding his condition or for health maintenance advice. Please see specific information pulled into the AVS if appropriate.    Dragon disclaimer:   Much of this encounter note is an electronic transcription/translation of spoken language to printed text. The electronic translation of spoken language may permit erroneous, or at times, nonsensical words or phrases to be inadvertently transcribed; Although I have reviewed the note for such errors, some may still exist.     Additional Patient Counseling:       Patient Instructions     It's Summer Time!    Stay hydrated when outside  If your urine starts to get darker, you are not drinking enough     Protect yourself from ticks and mosquitos  Here are the best ingredients to look for:  · DEET (N,N-diethyl-m-toluamide or N,N-diethyl-3-methyl-benzamide)  · Picaridin  · Oil of lemon eucalyptus (p-menthane-3,8-diol or PMD)  Wear light-colored pants so you can spot ticks easier  If you use a permethrin product, ONLY apply to clothing    Practice Safe Sun!    · Use sun screen SPF >30 daily, reapply regularly per directions on package  · See dermatologist for skin check regularly  · Protect your eyes with sunglasses with UV protection    Poison Ivy  If you are going into areas that may have poison ivy, prepare with a product like IvyX or other ivy blocker.  Wash  your clothes and pets after being in an area with ivy when returning home. If you come in contact with poison ivy, try a product like Technu     Other things you should incorporate all year...     Diet:    • Eat vegetables, fruits, whole grain, low-fat dairy, poultry, fish, beans, nontropical vegetable oils, and nuts, but avoid red meat (i.e., Mediterranean-style diet, DASH [Dietary Approaches to Stop Hypertension] diet).  • Limit sugary drinks and sweets.  • Limit saturated and trans fat to 5% to 6% of calories.  • Limit sodium intake to 2,400 mg daily (about one teaspoon table salt [kosher/sea salt have less sodium per teaspoon]).  Weight loss / Calorie Counting Apps:    • Lose It!   • MyFitSimple Pal   • Works great when you try it with a partner/ friend. It takes about 15 minutes a day but studies show that this simple method of monitoring your intake can help you achieve goals as it keeps you accountable.  I often will ask patients to try these apps just to get an idea of how much sodium and how many carbohydrates you are taking in.   Exercise:   • Engage in moderate-to-vigorous aerobic activity for at least 40 minutes (on average) three to four times each week.  Wearables:   • Activity tracker   • Step tracker: getting 7,500 steps daily can cut your cardiac risks by 44%   Bone Health:   • Https://www.nof.org/patients/treatment/nutrition/  • Routine weight bearing exercise      If you have not yet had your COVID vaccine, I highly recommend you schedule to have one ASAP.   You are not only protecting your health but you are protecting a love one that may be more vulnerable than you if they were to contract the virus.     If you are vaccinated, please be advised you can still contract COVID.   The goal of the vaccine is to decrease severe outcomes which so far it has been very effective.

## 2021-07-14 NOTE — PATIENT INSTRUCTIONS
It's Summer Time!    Stay hydrated when outside  If your urine starts to get darker, you are not drinking enough     Protect yourself from ticks and mosquitos  Here are the best ingredients to look for:  · DEET (N,N-diethyl-m-toluamide or N,N-diethyl-3-methyl-benzamide)  · Picaridin  · Oil of lemon eucalyptus (p-menthane-3,8-diol or PMD)  Wear light-colored pants so you can spot ticks easier  If you use a permethrin product, ONLY apply to clothing    Practice Safe Sun!    · Use sun screen SPF >30 daily, reapply regularly per directions on package  · See dermatologist for skin check regularly  · Protect your eyes with sunglasses with UV protection    Poison Ivy  If you are going into areas that may have poison ivy, prepare with a product like IvyX or other ivy blocker.  Wash your clothes and pets after being in an area with ivy when returning home. If you come in contact with poison ivy, try a product like Technu     Other things you should incorporate all year...     Diet:    • Eat vegetables, fruits, whole grain, low-fat dairy, poultry, fish, beans, nontropical vegetable oils, and nuts, but avoid red meat (i.e., Mediterranean-style diet, DASH [Dietary Approaches to Stop Hypertension] diet).  • Limit sugary drinks and sweets.  • Limit saturated and trans fat to 5% to 6% of calories.  • Limit sodium intake to 2,400 mg daily (about one teaspoon table salt [kosher/sea salt have less sodium per teaspoon]).  Weight loss / Calorie Counting Apps:    • Lose It!   • MyFitStreak Pal   • Works great when you try it with a partner/ friend. It takes about 15 minutes a day but studies show that this simple method of monitoring your intake can help you achieve goals as it keeps you accountable.  I often will ask patients to try these apps just to get an idea of how much sodium and how many carbohydrates you are taking in.   Exercise:   • Engage in moderate-to-vigorous aerobic activity for at least 40 minutes (on average) three to  four times each week.  Wearables:   • Activity tracker   • Step tracker: getting 7,500 steps daily can cut your cardiac risks by 44%   Bone Health:   • Https://www.nof.org/patients/treatment/nutrition/  • Routine weight bearing exercise      If you have not yet had your COVID vaccine, I highly recommend you schedule to have one ASAP.   You are not only protecting your health but you are protecting a love one that may be more vulnerable than you if they were to contract the virus.     If you are vaccinated, please be advised you can still contract COVID.   The goal of the vaccine is to decrease severe outcomes which so far it has been very effective.

## 2021-07-15 LAB
ALBUMIN SERPL-MCNC: 4.5 G/DL (ref 3.5–5.2)
ALBUMIN/GLOB SERPL: 1.9 G/DL
ALP SERPL-CCNC: 56 U/L (ref 39–117)
ALT SERPL-CCNC: 43 U/L (ref 1–41)
AST SERPL-CCNC: 28 U/L (ref 1–40)
BILIRUB SERPL-MCNC: 0.5 MG/DL (ref 0–1.2)
BUN SERPL-MCNC: 15 MG/DL (ref 6–20)
BUN/CREAT SERPL: 14 (ref 7–25)
CALCIUM SERPL-MCNC: 10.1 MG/DL (ref 8.6–10.5)
CHLORIDE SERPL-SCNC: 101 MMOL/L (ref 98–107)
CHOLEST SERPL-MCNC: 209 MG/DL (ref 0–200)
CO2 SERPL-SCNC: 30.4 MMOL/L (ref 22–29)
CREAT SERPL-MCNC: 1.07 MG/DL (ref 0.76–1.27)
ERYTHROCYTE [DISTWIDTH] IN BLOOD BY AUTOMATED COUNT: 12.9 % (ref 12.3–15.4)
GLOBULIN SER CALC-MCNC: 2.4 GM/DL
GLUCOSE SERPL-MCNC: 90 MG/DL (ref 65–99)
HCT VFR BLD AUTO: 47.2 % (ref 37.5–51)
HCV AB S/CO SERPL IA: <0.1 S/CO RATIO (ref 0–0.9)
HDLC SERPL-MCNC: 47 MG/DL (ref 40–60)
HGB BLD-MCNC: 15.8 G/DL (ref 13–17.7)
LDLC SERPL CALC-MCNC: 147 MG/DL (ref 0–100)
LDLC/HDLC SERPL: 3.09 {RATIO}
MCH RBC QN AUTO: 29.9 PG (ref 26.6–33)
MCHC RBC AUTO-ENTMCNC: 33.5 G/DL (ref 31.5–35.7)
MCV RBC AUTO: 89.2 FL (ref 79–97)
PLATELET # BLD AUTO: 215 10*3/MM3 (ref 140–450)
POTASSIUM SERPL-SCNC: 4.4 MMOL/L (ref 3.5–5.2)
PROT SERPL-MCNC: 6.9 G/DL (ref 6–8.5)
PSA SERPL-MCNC: 1.2 NG/ML (ref 0–4)
RBC # BLD AUTO: 5.29 10*6/MM3 (ref 4.14–5.8)
SODIUM SERPL-SCNC: 139 MMOL/L (ref 136–145)
TRIGL SERPL-MCNC: 85 MG/DL (ref 0–150)
VLDLC SERPL CALC-MCNC: 15 MG/DL (ref 5–40)
WBC # BLD AUTO: 6.42 10*3/MM3 (ref 3.4–10.8)

## 2021-07-27 ENCOUNTER — TELEPHONE (OUTPATIENT)
Dept: GASTROENTEROLOGY | Facility: CLINIC | Age: 56
End: 2021-07-27

## 2021-08-21 ENCOUNTER — PREP FOR SURGERY (OUTPATIENT)
Dept: OTHER | Facility: HOSPITAL | Age: 56
End: 2021-08-21

## 2021-08-21 DIAGNOSIS — K63.5 COLON POLYP: Primary | ICD-10-CM

## 2021-10-13 ENCOUNTER — CLINICAL SUPPORT (OUTPATIENT)
Dept: INTERNAL MEDICINE | Facility: CLINIC | Age: 56
End: 2021-10-13

## 2021-10-13 DIAGNOSIS — Z23 NEED FOR SHINGLES VACCINE: ICD-10-CM

## 2021-10-13 PROCEDURE — 90750 HZV VACC RECOMBINANT IM: CPT | Performed by: INTERNAL MEDICINE

## 2021-10-13 PROCEDURE — 90471 IMMUNIZATION ADMIN: CPT | Performed by: INTERNAL MEDICINE

## 2021-10-22 PROBLEM — K63.5 COLON POLYP: Status: ACTIVE | Noted: 2021-10-22

## 2022-01-10 ENCOUNTER — OFFICE VISIT (OUTPATIENT)
Dept: INTERNAL MEDICINE | Facility: CLINIC | Age: 57
End: 2022-01-10

## 2022-01-10 VITALS
HEART RATE: 64 BPM | OXYGEN SATURATION: 98 % | DIASTOLIC BLOOD PRESSURE: 70 MMHG | BODY MASS INDEX: 31.97 KG/M2 | WEIGHT: 236 LBS | HEIGHT: 72 IN | TEMPERATURE: 98.4 F | SYSTOLIC BLOOD PRESSURE: 122 MMHG

## 2022-01-10 DIAGNOSIS — H65.92 LEFT NON-SUPPURATIVE OTITIS MEDIA: ICD-10-CM

## 2022-01-10 DIAGNOSIS — F41.9 ANXIETY AND DEPRESSION: Primary | ICD-10-CM

## 2022-01-10 DIAGNOSIS — F32.A ANXIETY AND DEPRESSION: Primary | ICD-10-CM

## 2022-01-10 PROCEDURE — 99214 OFFICE O/P EST MOD 30 MIN: CPT | Performed by: NURSE PRACTITIONER

## 2022-01-10 RX ORDER — SERTRALINE HYDROCHLORIDE 25 MG/1
25 TABLET, FILM COATED ORAL DAILY
Qty: 30 TABLET | Refills: 0 | Status: SHIPPED | OUTPATIENT
Start: 2022-01-10 | End: 2022-02-11

## 2022-01-10 RX ORDER — CEFDINIR 300 MG/1
300 CAPSULE ORAL 2 TIMES DAILY
Qty: 14 CAPSULE | Refills: 0 | Status: SHIPPED | OUTPATIENT
Start: 2022-01-10 | End: 2022-01-17

## 2022-01-10 NOTE — ASSESSMENT & PLAN NOTE
Patient's depression is recurrent and is moderate without psychosis. Their depression is currently active and the condition is worsening. This will be reassessed in 4 weeks. F/U as described:patient was prescribed an antidepressant medicine.  Patient should continue seeking out mental health therapist.   Continue with exercise regimen as well.

## 2022-01-10 NOTE — PROGRESS NOTES
"Chief Complaint  Depression (discuss possible medication) and Anxiety    Subjective          Amadeo Leary presents to [facility]  History of Present Illness  This is a 55 y/o male presenting to office with complaints of depression and anxiety. Patient reports some very recent stressful changes in life. Patient reports this has been ongoing since the fall of 2021. Patient reports lack of motivation and increased disinterest of life. Patient reports he has been exercising-- however, it's been hard to find motivation lately. Patient reports he is working on finding a therapist as well. Patient is interested in starting an SSRI to help with his symptoms. Patient denies any suicidal ideations or thoughts of self harm.     Patient also reporting significant difference in hearing in left ear. Patient reports this has been ongoing x 4-5 months. Patient reports history of left ear tube-- it was removed and left with the hole in the left ear drum.     Objective   Vital Signs:   /70   Pulse 64   Temp 98.4 °F (36.9 °C)   Ht 182.9 cm (72\")   Wt 107 kg (236 lb)   SpO2 98%   BMI 32.01 kg/m²     Physical Exam  Vitals and nursing note reviewed.   Constitutional:       Appearance: Normal appearance.   HENT:      Head: Normocephalic and atraumatic.      Left Ear: Decreased hearing noted. Tenderness present. Tympanic membrane is erythematous.   Eyes:      Extraocular Movements: Extraocular movements intact.      Conjunctiva/sclera: Conjunctivae normal.      Pupils: Pupils are equal, round, and reactive to light.   Cardiovascular:      Rate and Rhythm: Regular rhythm.   Pulmonary:      Effort: Pulmonary effort is normal. No respiratory distress.      Breath sounds: Normal breath sounds.   Abdominal:      General: There is no distension.      Palpations: Abdomen is soft.      Tenderness: There is no abdominal tenderness.   Musculoskeletal:         General: No swelling or tenderness. Normal " range of motion.      Cervical back: Normal range of motion and neck supple.   Skin:     General: Skin is warm and dry.      Capillary Refill: Capillary refill takes less than 2 seconds.   Neurological:      General: No focal deficit present.      Mental Status: He is alert and oriented to person, place, and time. Mental status is at baseline.   Psychiatric:         Attention and Perception: Attention normal.         Mood and Affect: Mood is anxious and depressed.         Speech: Speech normal.         Behavior: Behavior normal. Behavior is cooperative.         Thought Content: Thought content normal.         Cognition and Memory: Cognition and memory normal.         Judgment: Judgment normal.        Result Review :   The following data was reviewed by: KEELEY Rivera on 01/10/2022:  Common labs    Common Labsle 7/14/21 7/14/21 7/14/21 7/14/21    1604 1604 1604 1604   Glucose 90      BUN 15      Creatinine 1.07      eGFR Non  Am 71      eGFR African Am 87      Sodium 139      Potassium 4.4      Chloride 101      Calcium 10.1      Total Protein 6.9      Albumin 4.50      Total Bilirubin 0.5      Alkaline Phosphatase 56      AST (SGOT) 28      ALT (SGPT) 43 (A)      WBC   6.42    Hemoglobin   15.8    Hematocrit   47.2    Platelets   215    Total Cholesterol  209 (A)     Triglycerides  85     HDL Cholesterol  47     LDL Cholesterol   147 (A)     PSA    1.200   (A) Abnormal value       Comments are available for some flowsheets but are not being displayed.           Assessment and Plan    Diagnoses and all orders for this visit:    1. Anxiety and depression (Primary)  Assessment & Plan:  Patient's depression is recurrent and is moderate without psychosis. Their depression is currently active and the condition is worsening. This will be reassessed in 4 weeks. F/U as described:patient was prescribed an antidepressant medicine.  Patient should continue seeking out mental health therapist.   Continue with  exercise regimen as well.     Orders:  -     sertraline (Zoloft) 25 MG tablet; Take 1 tablet by mouth Daily for 30 days.  Dispense: 30 tablet; Refill: 0    2. Left non-suppurative otitis media  Assessment & Plan:  Omnicef BID x 7 days.       Orders:  -     cefdinir (OMNICEF) 300 MG capsule; Take 1 capsule by mouth 2 (Two) Times a Day for 7 days.  Dispense: 14 capsule; Refill: 0      Follow Up   Return in about 4 weeks (around 2/7/2022) for Recheck anxiety and depression.  Patient was given instructions and counseling regarding his condition or for health maintenance advice. Please see specific information pulled into the AVS if appropriate.

## 2022-01-12 ENCOUNTER — ANESTHESIA EVENT (OUTPATIENT)
Dept: GASTROENTEROLOGY | Facility: HOSPITAL | Age: 57
End: 2022-01-12

## 2022-01-12 ENCOUNTER — ANESTHESIA (OUTPATIENT)
Dept: GASTROENTEROLOGY | Facility: HOSPITAL | Age: 57
End: 2022-01-12

## 2022-01-12 ENCOUNTER — HOSPITAL ENCOUNTER (OUTPATIENT)
Facility: HOSPITAL | Age: 57
Setting detail: HOSPITAL OUTPATIENT SURGERY
Discharge: HOME | End: 2022-01-12
Attending: INTERNAL MEDICINE | Admitting: INTERNAL MEDICINE

## 2022-01-12 VITALS
BODY MASS INDEX: 30.46 KG/M2 | OXYGEN SATURATION: 98 % | RESPIRATION RATE: 15 BRPM | DIASTOLIC BLOOD PRESSURE: 57 MMHG | HEIGHT: 72 IN | TEMPERATURE: 98.5 F | HEART RATE: 56 BPM | WEIGHT: 224.9 LBS | SYSTOLIC BLOOD PRESSURE: 101 MMHG

## 2022-01-12 DIAGNOSIS — K63.5 COLON POLYP: ICD-10-CM

## 2022-01-12 LAB
ALBUMIN SERPL-MCNC: 3.9 G/DL (ref 3.5–5.2)
ALBUMIN/GLOB SERPL: 1.8 G/DL
ALP SERPL-CCNC: 42 U/L (ref 39–117)
ALT SERPL W P-5'-P-CCNC: 35 U/L (ref 1–41)
ANION GAP SERPL CALCULATED.3IONS-SCNC: 7.9 MMOL/L (ref 5–15)
AST SERPL-CCNC: 23 U/L (ref 1–40)
BASOPHILS # BLD AUTO: 0.02 10*3/MM3 (ref 0–0.2)
BASOPHILS NFR BLD AUTO: 0.4 % (ref 0–1.5)
BILIRUB SERPL-MCNC: 0.6 MG/DL (ref 0–1.2)
BUN SERPL-MCNC: 13 MG/DL (ref 6–20)
BUN/CREAT SERPL: 13 (ref 7–25)
CALCIUM SPEC-SCNC: 9 MG/DL (ref 8.6–10.5)
CHLORIDE SERPL-SCNC: 104 MMOL/L (ref 98–107)
CO2 SERPL-SCNC: 29.1 MMOL/L (ref 22–29)
CREAT SERPL-MCNC: 1 MG/DL (ref 0.76–1.27)
DEPRECATED RDW RBC AUTO: 40 FL (ref 37–54)
EOSINOPHIL # BLD AUTO: 0.07 10*3/MM3 (ref 0–0.4)
EOSINOPHIL NFR BLD AUTO: 1.5 % (ref 0.3–6.2)
ERYTHROCYTE [DISTWIDTH] IN BLOOD BY AUTOMATED COUNT: 12.6 % (ref 12.3–15.4)
GFR SERPL CREATININE-BSD FRML MDRD: 77 ML/MIN/1.73
GLOBULIN UR ELPH-MCNC: 2.2 GM/DL
GLUCOSE SERPL-MCNC: 100 MG/DL (ref 65–99)
HCT VFR BLD AUTO: 42.5 % (ref 37.5–51)
HGB BLD-MCNC: 14.8 G/DL (ref 13–17.7)
IMM GRANULOCYTES # BLD AUTO: 0.03 10*3/MM3 (ref 0–0.05)
IMM GRANULOCYTES NFR BLD AUTO: 0.6 % (ref 0–0.5)
LYMPHOCYTES # BLD AUTO: 1.33 10*3/MM3 (ref 0.7–3.1)
LYMPHOCYTES NFR BLD AUTO: 28.6 % (ref 19.6–45.3)
MCH RBC QN AUTO: 30.5 PG (ref 26.6–33)
MCHC RBC AUTO-ENTMCNC: 34.8 G/DL (ref 31.5–35.7)
MCV RBC AUTO: 87.4 FL (ref 79–97)
MONOCYTES # BLD AUTO: 0.5 10*3/MM3 (ref 0.1–0.9)
MONOCYTES NFR BLD AUTO: 10.8 % (ref 5–12)
NEUTROPHILS NFR BLD AUTO: 2.7 10*3/MM3 (ref 1.7–7)
NEUTROPHILS NFR BLD AUTO: 58.1 % (ref 42.7–76)
NRBC BLD AUTO-RTO: 0 /100 WBC (ref 0–0.2)
PLATELET # BLD AUTO: 194 10*3/MM3 (ref 140–450)
PMV BLD AUTO: 10.6 FL (ref 6–12)
POTASSIUM SERPL-SCNC: 4.1 MMOL/L (ref 3.5–5.2)
PROT SERPL-MCNC: 6.1 G/DL (ref 6–8.5)
RBC # BLD AUTO: 4.86 10*6/MM3 (ref 4.14–5.8)
SODIUM SERPL-SCNC: 141 MMOL/L (ref 136–145)
TSH SERPL DL<=0.05 MIU/L-ACNC: 2.07 UIU/ML (ref 0.27–4.2)
WBC NRBC COR # BLD: 4.65 10*3/MM3 (ref 3.4–10.8)

## 2022-01-12 PROCEDURE — 86364 TISS TRNSGLTMNASE EA IG CLAS: CPT | Performed by: INTERNAL MEDICINE

## 2022-01-12 PROCEDURE — 80050 GENERAL HEALTH PANEL: CPT | Performed by: INTERNAL MEDICINE

## 2022-01-12 PROCEDURE — 82784 ASSAY IGA/IGD/IGG/IGM EACH: CPT | Performed by: INTERNAL MEDICINE

## 2022-01-12 PROCEDURE — 45380 COLONOSCOPY AND BIOPSY: CPT | Performed by: INTERNAL MEDICINE

## 2022-01-12 PROCEDURE — 25010000002 PROPOFOL 10 MG/ML EMULSION: Performed by: ANESTHESIOLOGY

## 2022-01-12 PROCEDURE — 86258 DGP ANTIBODY EACH IG CLASS: CPT | Performed by: INTERNAL MEDICINE

## 2022-01-12 PROCEDURE — 86231 EMA EACH IG CLASS: CPT | Performed by: INTERNAL MEDICINE

## 2022-01-12 PROCEDURE — 88305 TISSUE EXAM BY PATHOLOGIST: CPT | Performed by: INTERNAL MEDICINE

## 2022-01-12 RX ORDER — SODIUM CHLORIDE 0.9 % (FLUSH) 0.9 %
10 SYRINGE (ML) INJECTION AS NEEDED
Status: DISCONTINUED | OUTPATIENT
Start: 2022-01-12 | End: 2022-01-12 | Stop reason: HOSPADM

## 2022-01-12 RX ORDER — SODIUM CHLORIDE 0.9 % (FLUSH) 0.9 %
3 SYRINGE (ML) INJECTION EVERY 12 HOURS SCHEDULED
Status: DISCONTINUED | OUTPATIENT
Start: 2022-01-12 | End: 2022-01-12 | Stop reason: HOSPADM

## 2022-01-12 RX ORDER — PROPOFOL 10 MG/ML
VIAL (ML) INTRAVENOUS AS NEEDED
Status: DISCONTINUED | OUTPATIENT
Start: 2022-01-12 | End: 2022-01-12 | Stop reason: SURG

## 2022-01-12 RX ORDER — LIDOCAINE HYDROCHLORIDE 20 MG/ML
INJECTION, SOLUTION INFILTRATION; PERINEURAL AS NEEDED
Status: DISCONTINUED | OUTPATIENT
Start: 2022-01-12 | End: 2022-01-12 | Stop reason: SURG

## 2022-01-12 RX ORDER — SODIUM CHLORIDE, SODIUM LACTATE, POTASSIUM CHLORIDE, CALCIUM CHLORIDE 600; 310; 30; 20 MG/100ML; MG/100ML; MG/100ML; MG/100ML
30 INJECTION, SOLUTION INTRAVENOUS CONTINUOUS PRN
Status: DISCONTINUED | OUTPATIENT
Start: 2022-01-12 | End: 2022-01-12 | Stop reason: HOSPADM

## 2022-01-12 RX ADMIN — PROPOFOL 470 MG: 10 INJECTION, EMULSION INTRAVENOUS at 09:37

## 2022-01-12 RX ADMIN — LIDOCAINE HYDROCHLORIDE 100 MG: 20 INJECTION, SOLUTION INFILTRATION; PERINEURAL at 09:35

## 2022-01-12 RX ADMIN — SODIUM CHLORIDE, POTASSIUM CHLORIDE, SODIUM LACTATE AND CALCIUM CHLORIDE 30 ML/HR: 600; 310; 30; 20 INJECTION, SOLUTION INTRAVENOUS at 09:01

## 2022-01-12 NOTE — ANESTHESIA PREPROCEDURE EVALUATION
Anesthesia Evaluation     Patient summary reviewed and Nursing notes reviewed   NPO Solid Status: > 8 hours             Airway   Mallampati: II  TM distance: >3 FB  Neck ROM: full  no difficulty expected  Dental - normal exam     Pulmonary - normal exam   Cardiovascular - normal exam        Neuro/Psych  (+) psychiatric history Anxiety and Depression,     GI/Hepatic/Renal/Endo    (+) obesity,       Musculoskeletal     Abdominal  - normal exam   Substance History      OB/GYN          Other                        Anesthesia Plan    ASA 2     MAC     intravenous induction     Anesthetic plan, all risks, benefits, and alternatives have been provided, discussed and informed consent has been obtained with: patient.

## 2022-01-12 NOTE — ANESTHESIA POSTPROCEDURE EVALUATION
"Patient: Amadeo Leary    Procedure Summary     Date: 01/12/22 Room / Location: [facility] ENDOSCOPY 10 /  [facility]    Anesthesia Start: 0932 Anesthesia Stop: 1009    Procedure: COLONOSCOPY to cecum and TI:  cold bx polyps, polypoid mass bx, internal hemorrhoids, rectal bx (N/A ) Diagnosis:       Colon polyp      (Colon polyp [K63.5])    Surgeons: Amadeo Gan MD Provider: Gabriele Sutton MD    Anesthesia Type: MAC ASA Status: 2          Anesthesia Type: MAC    Vitals  Vitals Value Taken Time   /49 01/12/22 1026   Temp     Pulse 54 01/12/22 1035   Resp 15 01/12/22 1010   SpO2 98 % 01/12/22 1035   Vitals shown include unvalidated device data.        Post Anesthesia Care and Evaluation    Patient location during evaluation: bedside  Patient participation: complete - patient participated  Level of consciousness: awake and alert  Pain management: adequate  Airway patency: patent  Anesthetic complications: No anesthetic complications    Cardiovascular status: acceptable  Respiratory status: acceptable  Hydration status: acceptable    Comments: /57   Pulse 56   Temp 36.9 °C (98.5 °F) (Oral)   Resp 15   Ht 182.9 cm (72\")   Wt 102 kg (224 lb 14.4 oz)   SpO2 98%   BMI 30.50 kg/m²       "

## 2022-01-12 NOTE — H&P
"[facility]  Pre Procedure History & Physical    Chief Complaint:   Time for my colonoscopy    Subjective     HPI:   56 y.o. male who has a history of a colonic adenomous polyp.  He last had a colonoscopy in August 2016 done by Dr. AKBAR Robles.     Past Medical History:   Past Medical History:   Diagnosis Date   • Eardrum rupture, left    • Hx of colonic polyp    • Seasonal allergies        Family History:  Family History   Problem Relation Age of Onset   • Heart attack Father         Age 47   Heavy smoker    • Colon cancer Neg Hx    • Prostate cancer Neg Hx    • Malig Hyperthermia Neg Hx        Social History:   reports that he has never smoked. He has never used smokeless tobacco. He reports current alcohol use. He reports that he does not use drugs.    Medications:   Medications Prior to Admission   Medication Sig Dispense Refill Last Dose   • cefdinir (OMNICEF) 300 MG capsule Take 1 capsule by mouth 2 (Two) Times a Day for 7 days. (Patient taking differently: Take 300 mg by mouth 2 (Two) Times a Day. WILL START AFTER ENDO TOMORROW) 14 capsule 0 Unknown at Unknown time   • sertraline (Zoloft) 25 MG tablet Take 1 tablet by mouth Daily for 30 days. (Patient taking differently: Take 25 mg by mouth Daily. WILL START AFTER ENDO TOMORROW) 30 tablet 0 Unknown at Unknown time       Allergies:  Levaquin [levofloxacin in d5w]    ROS:    Pertinent items are noted in HPI     Objective     Blood pressure 111/66, pulse 56, temperature 98.5 °F (36.9 °C), temperature source Oral, resp. rate 14, height 182.9 cm (72\"), weight 102 kg (224 lb 14.4 oz), SpO2 97 %.    Physical Exam   Constitutional: Pt is oriented to person, place, and time and well-developed, well-nourished, and in no distress.   HENT:   Mouth/Throat: Oropharynx is clear and moist.   Neck: Normal range of motion. Neck supple.   Cardiovascular: Normal rate, regular rhythm and normal heart sounds.    Pulmonary/Chest: Effort normal and breath " sounds normal. No respiratory distress. No  wheezes.   Abdominal: Soft. Bowel sounds are normal.   Skin: Skin is warm and dry.   Psychiatric: Mood, memory, affect and judgment normal.     Assessment/Plan     Diagnosis:  56 y.o. male who has a history of a colonic adenomous polyp.  He has occasional diarrhea.  He last had a colonoscopy in August 2016 done by Dr. AKBAR Robles.     Anticipated Surgical Procedure:  Colonoscopy    The risks, benefits, and alternatives of this procedure have been discussed with the patient or the responsible party- the patient understands and agrees to proceed.    Amadeo Gan M.D.

## 2022-01-13 LAB
ENDOMYSIUM IGA SER QL: NEGATIVE
GLIADIN PEPTIDE IGA SER-ACNC: 3 UNITS (ref 0–19)
GLIADIN PEPTIDE IGG SER-ACNC: 1 UNITS (ref 0–19)
IGA SERPL-MCNC: 191 MG/DL (ref 90–386)
LAB AP CASE REPORT: NORMAL
PATH REPORT.FINAL DX SPEC: NORMAL
PATH REPORT.GROSS SPEC: NORMAL
TTG IGA SER-ACNC: <2 U/ML (ref 0–3)
TTG IGG SER-ACNC: <2 U/ML (ref 0–5)

## 2022-01-17 ENCOUNTER — TELEPHONE (OUTPATIENT)
Dept: GASTROENTEROLOGY | Facility: CLINIC | Age: 57
End: 2022-01-17

## 2022-01-17 NOTE — TELEPHONE ENCOUNTER
----- Message from Amadeo Gan MD sent at 1/17/2022  8:02 AM EST -----  01/17/22       Tell him that his lab work came back normal.       Some of the colon polyps were precancerous.  The rectal anorectal polypoid mass was precancerous.  I recommend that he go see a colorectal surgeon to see if she could remove surgically this anorectal mass?  Please give him the name and office phone number of Dr. Everett Bergeron so that he could schedule an appointment to see her.       Please send a copy of this report to his PCP.  Judi. [email or web address]

## 2022-01-17 NOTE — TELEPHONE ENCOUNTER
Call to pt.  Advise per Dr Gan note. Verb understanding.      Dr Bergeron's address and phone # given.  Message to Yuki OSORIO      Update to Dr Debbie Houston.

## 2022-01-17 NOTE — PROGRESS NOTES
01/17/22       Tell him that his lab work came back normal.       Some of the colon polyps were precancerous.  The rectal anorectal polypoid mass was precancerous.  I recommend that he go see a colorectal surgeon to see if she could remove surgically this anorectal mass?  Please give him the name and office phone number of Dr. Everett Bergeron so that he could schedule an appointment to see her.       Please send a copy of this report to his PCP.  Judi. [email or web address]

## 2022-01-20 ENCOUNTER — PATIENT MESSAGE (OUTPATIENT)
Dept: INTERNAL MEDICINE | Facility: CLINIC | Age: 57
End: 2022-01-20

## 2022-01-20 DIAGNOSIS — H65.92 LEFT NON-SUPPURATIVE OTITIS MEDIA: Primary | ICD-10-CM

## 2022-01-20 RX ORDER — CEFDINIR 300 MG/1
300 CAPSULE ORAL 2 TIMES DAILY
Qty: 6 CAPSULE | Refills: 0 | Status: SHIPPED | OUTPATIENT
Start: 2022-01-20 | End: 2022-01-23

## 2022-01-20 NOTE — TELEPHONE ENCOUNTER
From: Amadeo Leary  To: KEELEY Rivera  Sent: 1/20/2022 10:30 AM EST  Subject: Inner Ear Infection    Joseph Merida prescribed 300 mg Cefdinir for me for an inner ear infection. I have exhausted the 7 day dosage but I am still experiencing fluid drainage from my ear. This drainage started after my visit to you on the 10th. I didn't know if it would be reasonable to continue the Cefdinir for a few more days which would mean an additional prescription. Typically the drainage has coincided with my prior ear infections and causes hearing loss due to fluid plugging up my ear canal. What are your recommendations ?    Amadeo [email or web address]  [phone]

## 2022-01-28 ENCOUNTER — OFFICE VISIT (OUTPATIENT)
Dept: SURGERY | Facility: CLINIC | Age: 57
End: 2022-01-28

## 2022-01-28 VITALS
TEMPERATURE: 98.5 F | OXYGEN SATURATION: 98 % | WEIGHT: 230.3 LBS | DIASTOLIC BLOOD PRESSURE: 72 MMHG | BODY MASS INDEX: 31.19 KG/M2 | HEART RATE: 49 BPM | SYSTOLIC BLOOD PRESSURE: 108 MMHG | HEIGHT: 72 IN

## 2022-01-28 DIAGNOSIS — K62.1 RECTAL POLYP: Primary | ICD-10-CM

## 2022-01-28 PROCEDURE — 99203 OFFICE O/P NEW LOW 30 MIN: CPT | Performed by: COLON & RECTAL SURGERY

## 2022-01-28 NOTE — PROGRESS NOTES
Malcolm Leary is a 56 y.o. male who is seen as a consult at the request of Malcolm Connell MD for Consult (Rectal polyp).      HPI:bm - irreg  No rb  Some foods - bloat and diarrhea;   No fiber   No ss   no probiotics  Colonoscopy done and polyp found in distal rectum    Past Medical History:   Diagnosis Date   • Abnormal cardiovascular stress test 3/16/2016   • Abnormal EKG 03/2015   • Allergic rhinitis    • Anxiety    • Colon polyps     FOLLOWED BY DR. MALCOLM CONNELL   • Depression    • Eardrum rupture, left 11/2016   • Elevated LDL cholesterol level 03/2015   • Hemorrhoids    • Rectal mass 01/2022    BENIGN POLYPOID MASS, FOLLOWED BY DR. KELSEY BLANCO   • Seasonal allergies      Past Surgical History:   Procedure Laterality Date   • CARDIAC CATHETERIZATION Left 03/12/2015    Normal coronary arteries, normal left ventricular systolic function.DR. CRISTINA HARMAN AT [facility]   • COLONOSCOPY N/A 8/26/2016    1 TUBULAR ADENOMA POLYP IN TRANSVERSE, DR. CLAY MORALES AT [facility]   • COLONOSCOPY N/A 1/12/2022    1 TUBULAR ADENOMA POLYP AT 80CM, 1 TUBULAR ADENOMA POLYP IN CECUM, 1 BENIGN POLYP IN RECTUM, 8 MM POLYPOID MASS IN RECTUM ABOVE HEMORRHOIDS, PATH:BENIGN, MILD INTERNAL HEMORRHOIDS, DR. MALCOLM CONNELL AT [facility]   • COLONOSCOPY N/A 11/10/2006    DR. JEREMY PALMER AT [facility]   • EAR TUBE REMOVAL Left 03/21/2017    DR. MARK SEVERTSON   • EAR TUBE REMOVAL Left 06/06/2017    DR. MARK SEVERTSON   • EAR TUBES Left     X2, 2017/ 2003.   • MYRINGOPLASTY Left 03/21/2017    DR. MARK SEVERTSON   • SHOULDER ARTHROSCOPY W/ LABRAL REPAIR Left 08/28/2020    WITH CHONDROPLASTY, ACROMIOPLASTY, AND EXCISION OF CALCIFIC DEPOSIT, DR. NALLELY LUGO       Social History:   reports that he has never smoked. He has never used smokeless tobacco. He reports current alcohol use. He reports that he does not use drugs.      Marriage status:     Family History   Problem Relation Age of Onset   • Heart attack Father         Age 47   Heavy smoker    • Heart  disease Father    • Colon polyps Mother    • Colon cancer Neg Hx    • Prostate cancer Neg Hx    • Malig Hyperthermia Neg Hx          Current Outpatient Medications:   •  sertraline (Zoloft) 25 MG tablet, Take 1 tablet by mouth Daily for 30 days. (Patient taking differently: Take 25 mg by mouth Daily. WILL START AFTER ENDO TOMORROW), Disp: 30 tablet, Rfl: 0    Allergy  Levaquin [levofloxacin in d5w]    Review of Systems   Constitutional: Negative for decreased appetite and weight gain.   HENT: Negative for congestion, hearing loss and hoarse voice.    Eyes: Negative for blurred vision, discharge and visual disturbance.   Cardiovascular: Negative for chest pain, cyanosis and leg swelling.   Respiratory: Negative for cough, shortness of breath, sleep disturbances due to breathing and snoring.    Endocrine: Negative for cold intolerance and heat intolerance.   Hematologic/Lymphatic: Does not bruise/bleed easily.   Skin: Negative for itching, poor wound healing and skin cancer.   Musculoskeletal: Negative for arthritis, back pain, joint pain and joint swelling.   Gastrointestinal: Negative for abdominal pain, change in bowel habit, bowel incontinence and constipation.   Genitourinary: Negative for bladder incontinence, dysuria and hematuria.   Neurological: Negative for brief paralysis, excessive daytime sleepiness, dizziness, focal weakness, headaches, light-headedness and weakness.   Psychiatric/Behavioral: Negative for altered mental status and hallucinations. The patient does not have insomnia.    Allergic/Immunologic: Negative for HIV exposure and persistent infections.   All other systems reviewed and are negative.      Vitals:    01/28/22 1007   BP: 108/72   Pulse: (!) 49   Temp: 98.5 °F (36.9 °C)   SpO2: 98%     Body mass index is 31.23 kg/m².    Physical Exam  Exam conducted with a chaperone present.   Constitutional:       General: He is not in acute distress.     Appearance: He is well-developed.   HENT:       Head: Normocephalic and atraumatic.      Nose: Nose normal.   Eyes:      Conjunctiva/sclera: Conjunctivae normal.      Pupils: Pupils are equal, round, and reactive to light.   Neck:      Trachea: No tracheal deviation.   Pulmonary:      Effort: Pulmonary effort is normal. No respiratory distress.      Breath sounds: Normal breath sounds.   Abdominal:      General: Bowel sounds are normal. There is no distension.      Palpations: Abdomen is soft.   Musculoskeletal:         General: No deformity. Normal range of motion.      Cervical back: Normal range of motion.   Skin:     General: Skin is warm and dry.   Neurological:      Mental Status: He is alert and oriented to person, place, and time.      Cranial Nerves: No cranial nerve deficit.      Coordination: Coordination normal.      Gait: Gait normal.   Psychiatric:         Behavior: Behavior normal.         Judgment: Judgment normal.         Review of Medical Record:  I reviewed colonoscopy report and it shows a rectal polyp at the dentate line    Assessment:  1. Rectal polyp        Plan: I recommend doing a flexible sigmoidoscopy with polypectomy of rectal polyp.  I discussed options of surgical transanal excision versus flex sig approach.  It will be cheaper for the patient to do it in endoscopy.  I discussed with patient that because of the low nature and proximity to hemorrhoids he may have some bleeding.  During the flexible sigmoidoscopy if there is bleeding I would be able to suture it.  I described with patient that there should be fairly minimal pain.  I described with patient typical surgical time, postop recovery including pain management, and restrictions. I discussed with patient risks, benefits, and alternatives.  The patient had opportunity to ask questions.  I answered all questions.  Patient understands and wishes to proceed with procedure.

## 2022-02-11 ENCOUNTER — OFFICE VISIT (OUTPATIENT)
Dept: INTERNAL MEDICINE | Facility: CLINIC | Age: 57
End: 2022-02-11

## 2022-02-11 VITALS
WEIGHT: 229 LBS | OXYGEN SATURATION: 97 % | HEART RATE: 67 BPM | HEIGHT: 72 IN | DIASTOLIC BLOOD PRESSURE: 72 MMHG | SYSTOLIC BLOOD PRESSURE: 134 MMHG | BODY MASS INDEX: 31.02 KG/M2 | TEMPERATURE: 97.6 F

## 2022-02-11 DIAGNOSIS — H65.191 ACUTE EFFUSION OF RIGHT EAR: ICD-10-CM

## 2022-02-11 DIAGNOSIS — F41.9 ANXIETY AND DEPRESSION: Primary | ICD-10-CM

## 2022-02-11 DIAGNOSIS — F32.A ANXIETY AND DEPRESSION: Primary | ICD-10-CM

## 2022-02-11 DIAGNOSIS — H90.72 MIXED CONDUCTIVE AND SENSORINEURAL HEARING LOSS OF LEFT EAR WITH UNRESTRICTED HEARING OF RIGHT EAR: ICD-10-CM

## 2022-02-11 PROBLEM — H65.92 LEFT NON-SUPPURATIVE OTITIS MEDIA: Status: RESOLVED | Noted: 2022-01-10 | Resolved: 2022-02-11

## 2022-02-11 PROCEDURE — 99213 OFFICE O/P EST LOW 20 MIN: CPT | Performed by: NURSE PRACTITIONER

## 2022-02-11 RX ORDER — FLUTICASONE PROPIONATE 50 MCG
2 SPRAY, SUSPENSION (ML) NASAL DAILY
COMMUNITY

## 2022-02-11 NOTE — ASSESSMENT & PLAN NOTE
Patient's depression is recurrent and is moderate without psychosis. Their depression is currently in partial remission and the condition is improving with treatment. This will be reassessed at the next regular appointment. F/U as described:patient will continue current medication therapy.  Continues following with with therapist as well.   Increase zoloft to 50mg nightly.

## 2022-02-11 NOTE — PROGRESS NOTES
"Chief Complaint  Follow-up and Anxiety    Subjective          Amadeo Leary presents to [facility]  History of Present Illness  This is a 57 y/o male presenting to office for f/u with anxiety and ear issues.     Patient continues on zoloft-- reports improvement with symptoms. Reports experiencing variant night sweats-- unsure if this is related. Is interested in increasing.   Objective   Vital Signs:   /72   Pulse 67   Temp 97.6 °F (36.4 °C)   Ht 182.9 cm (72\")   Wt 104 kg (229 lb)   SpO2 97%   BMI 31.06 kg/m²     Physical Exam  Vitals and nursing note reviewed.   Constitutional:       Appearance: Normal appearance.   HENT:      Head: Normocephalic.   Eyes:      Pupils: Pupils are equal, round, and reactive to light.   Cardiovascular:      Rate and Rhythm: Regular rhythm.      Pulses: Normal pulses.   Pulmonary:      Effort: Pulmonary effort is normal.      Breath sounds: Normal breath sounds.   Abdominal:      General: Bowel sounds are normal.      Palpations: Abdomen is soft.   Musculoskeletal:         General: Normal range of motion.      Cervical back: Normal range of motion and neck supple.   Skin:     General: Skin is warm and dry.   Neurological:      Mental Status: He is alert and oriented to person, place, and time. Mental status is at baseline.   Psychiatric:         Mood and Affect: Mood normal.        Result Review :                 Assessment and Plan    Diagnoses and all orders for this visit:    1. Anxiety and depression (Primary)  Assessment & Plan:  Patient's depression is recurrent and is moderate without psychosis. Their depression is currently in partial remission and the condition is improving with treatment. This will be reassessed at the next regular appointment. F/U as described:patient will continue current medication therapy.  Continues following with with therapist as well.   Increase zoloft to 50mg nightly.     Orders:  -     sertraline " (Zoloft) 50 MG tablet; Take 1 tablet by mouth Daily for 90 days.  Dispense: 30 tablet; Refill: 2    2. Acute effusion of right ear  Assessment & Plan:  Continues with flonase.   F/u with ENT as recommended.       3. Mixed conductive and sensorineural hearing loss of left ear with unrestricted hearing of right ear  Assessment & Plan:  Referral to ENT.     Orders:  -     Ambulatory Referral to ENT (Otolaryngology)      Follow Up   Return in about 5 months (around 7/25/2022) for Annual physical.  Patient was given instructions and counseling regarding his condition or for health maintenance advice. Please see specific information pulled into the AVS if appropriate.

## 2022-02-24 ENCOUNTER — HOSPITAL ENCOUNTER (OUTPATIENT)
Facility: HOSPITAL | Age: 57
Setting detail: HOSPITAL OUTPATIENT SURGERY
Discharge: HOME | End: 2022-02-24
Attending: COLON & RECTAL SURGERY | Admitting: COLON & RECTAL SURGERY

## 2022-02-24 ENCOUNTER — ANESTHESIA EVENT (OUTPATIENT)
Dept: GASTROENTEROLOGY | Facility: HOSPITAL | Age: 57
End: 2022-02-24

## 2022-02-24 ENCOUNTER — ANESTHESIA (OUTPATIENT)
Dept: GASTROENTEROLOGY | Facility: HOSPITAL | Age: 57
End: 2022-02-24

## 2022-02-24 VITALS
SYSTOLIC BLOOD PRESSURE: 107 MMHG | DIASTOLIC BLOOD PRESSURE: 66 MMHG | RESPIRATION RATE: 16 BRPM | BODY MASS INDEX: 30.61 KG/M2 | WEIGHT: 226 LBS | HEIGHT: 72 IN | OXYGEN SATURATION: 100 % | TEMPERATURE: 97.9 F | HEART RATE: 51 BPM

## 2022-02-24 DIAGNOSIS — K62.1 RECTAL POLYP: ICD-10-CM

## 2022-02-24 PROCEDURE — 25010000002 PROPOFOL 10 MG/ML EMULSION: Performed by: ANESTHESIOLOGY

## 2022-02-24 PROCEDURE — 88305 TISSUE EXAM BY PATHOLOGIST: CPT | Performed by: COLON & RECTAL SURGERY

## 2022-02-24 PROCEDURE — 45338 SIGMOIDOSCOPY W/TUMR REMOVE: CPT | Performed by: COLON & RECTAL SURGERY

## 2022-02-24 RX ORDER — SODIUM CHLORIDE, SODIUM LACTATE, POTASSIUM CHLORIDE, CALCIUM CHLORIDE 600; 310; 30; 20 MG/100ML; MG/100ML; MG/100ML; MG/100ML
1000 INJECTION, SOLUTION INTRAVENOUS CONTINUOUS
Status: DISCONTINUED | OUTPATIENT
Start: 2022-02-24 | End: 2022-02-24 | Stop reason: HOSPADM

## 2022-02-24 RX ORDER — PROPOFOL 10 MG/ML
VIAL (ML) INTRAVENOUS AS NEEDED
Status: DISCONTINUED | OUTPATIENT
Start: 2022-02-24 | End: 2022-02-24 | Stop reason: SURG

## 2022-02-24 RX ORDER — LIDOCAINE HYDROCHLORIDE 10 MG/ML
0.5 INJECTION, SOLUTION INFILTRATION; PERINEURAL ONCE AS NEEDED
Status: DISCONTINUED | OUTPATIENT
Start: 2022-02-24 | End: 2022-02-24 | Stop reason: HOSPADM

## 2022-02-24 RX ORDER — PROPOFOL 10 MG/ML
VIAL (ML) INTRAVENOUS CONTINUOUS PRN
Status: DISCONTINUED | OUTPATIENT
Start: 2022-02-24 | End: 2022-02-24 | Stop reason: SURG

## 2022-02-24 RX ORDER — LIDOCAINE HYDROCHLORIDE 20 MG/ML
INJECTION, SOLUTION INFILTRATION; PERINEURAL AS NEEDED
Status: DISCONTINUED | OUTPATIENT
Start: 2022-02-24 | End: 2022-02-24 | Stop reason: SURG

## 2022-02-24 RX ORDER — SODIUM CHLORIDE 0.9 % (FLUSH) 0.9 %
10 SYRINGE (ML) INJECTION AS NEEDED
Status: DISCONTINUED | OUTPATIENT
Start: 2022-02-24 | End: 2022-02-24 | Stop reason: HOSPADM

## 2022-02-24 RX ADMIN — Medication 200 MCG/KG/MIN: at 14:09

## 2022-02-24 RX ADMIN — PROPOFOL 200 MG: 10 INJECTION, EMULSION INTRAVENOUS at 14:09

## 2022-02-24 RX ADMIN — SODIUM CHLORIDE, POTASSIUM CHLORIDE, SODIUM LACTATE AND CALCIUM CHLORIDE 1000 ML: 600; 310; 30; 20 INJECTION, SOLUTION INTRAVENOUS at 13:29

## 2022-02-24 RX ADMIN — LIDOCAINE HYDROCHLORIDE 60 MG: 20 INJECTION, SOLUTION INFILTRATION; PERINEURAL at 14:09

## 2022-02-24 NOTE — ANESTHESIA PREPROCEDURE EVALUATION
Anesthesia Evaluation     Patient summary reviewed and Nursing notes reviewed                Airway   Mallampati: II  TM distance: >3 FB  Neck ROM: full  No difficulty expected  Dental - normal exam     Pulmonary - normal exam   Cardiovascular - normal exam        Neuro/Psych  (+) psychiatric history Anxiety and Depression,    GI/Hepatic/Renal/Endo    (+) obesity,       Musculoskeletal     Abdominal   (+) obese,    Substance History      OB/GYN          Other                        Anesthesia Plan    ASA 2     MAC     intravenous induction     Anesthetic plan, all risks, benefits, and alternatives have been provided, discussed and informed consent has been obtained with: patient.        CODE STATUS:

## 2022-02-24 NOTE — ANESTHESIA POSTPROCEDURE EVALUATION
Patient: Amadeo Leary    Procedure Summary     Date: 02/24/22 Room / Location:  [facility]    Anesthesia Start: 1406 Anesthesia Stop: 1427    Procedure: SIGMOIDOSCOPY FLEXIBLE TO DESCENDING COLON WITH HOT SNARE POLYPECTOMY (N/A ) Diagnosis:       Rectal polyp      (Rectal polyp [K62.1])    Surgeons: Everett Bergeron MD Provider: Yoel Reyes MD    Anesthesia Type: MAC ASA Status: 2          Anesthesia Type: MAC    Vitals  No vitals data found for the desired time range.          Post Anesthesia Care and Evaluation    Patient location during evaluation: PHASE II  Patient participation: complete - patient participated  Level of consciousness: awake and alert  Pain management: adequate  Airway patency: patent  Anesthetic complications: No anesthetic complications  PONV Status: none  Cardiovascular status: acceptable and hemodynamically stable  Respiratory status: acceptable, nonlabored ventilation and spontaneous ventilation  Hydration status: acceptable

## 2022-02-25 LAB
LAB AP CASE REPORT: NORMAL
PATH REPORT.FINAL DX SPEC: NORMAL
PATH REPORT.GROSS SPEC: NORMAL

## 2022-03-28 ENCOUNTER — PREP FOR SURGERY (OUTPATIENT)
Dept: OTHER | Facility: HOSPITAL | Age: 57
End: 2022-03-28

## 2022-03-28 DIAGNOSIS — Z86.010 HISTORY OF COLON POLYPS: Primary | ICD-10-CM

## 2022-03-29 PROBLEM — Z86.010 HISTORY OF COLON POLYPS: Status: ACTIVE | Noted: 2022-03-29

## 2022-03-29 PROBLEM — Z86.0100 HISTORY OF COLON POLYPS: Status: ACTIVE | Noted: 2022-03-29

## 2022-05-19 DIAGNOSIS — F32.A ANXIETY AND DEPRESSION: ICD-10-CM

## 2022-05-19 DIAGNOSIS — F41.9 ANXIETY AND DEPRESSION: ICD-10-CM

## 2022-06-15 ENCOUNTER — TELEPHONE (OUTPATIENT)
Dept: GASTROENTEROLOGY | Facility: CLINIC | Age: 57
End: 2022-06-15

## 2022-06-15 NOTE — TELEPHONE ENCOUNTER
Call to pt.  States would like to discuss IBS issues with DR Gan.     F/u appt scheduled with Dr Gan for 8/3 @ 10:15.      Pt asking if any sooner appts available - offer appt with [facility].  Scheduled for 6/20 @ 9am with [facility].  Will determine at that time if 8/3 appt still needed.

## 2022-06-15 NOTE — TELEPHONE ENCOUNTER
"----- Message from Amadeo Leary sent at 6/15/2022 10:43 AM EDT -----  Regarding: Appointment  I have attempted to call no less than 5 different times to only be placed on hold for more than 10 minutes each time.  When the \"hold\" ceased and the office phone began to ring, it rang continuously with no answer.  I had hoped to have a follow-up appointment from my original colonoscopy on Jan 12th, 2022 to discuss continuing IBS issues.  I realize that you are busy, but can't you have a call-back option?  I may have to look for another option.    Amadeo [email or web address]  [phone]  "

## 2022-06-20 ENCOUNTER — OFFICE VISIT (OUTPATIENT)
Dept: GASTROENTEROLOGY | Facility: CLINIC | Age: 57
End: 2022-06-20

## 2022-06-20 VITALS
BODY MASS INDEX: 31.4 KG/M2 | SYSTOLIC BLOOD PRESSURE: 112 MMHG | WEIGHT: 231.8 LBS | DIASTOLIC BLOOD PRESSURE: 68 MMHG | HEIGHT: 72 IN | HEART RATE: 51 BPM | TEMPERATURE: 96.5 F

## 2022-06-20 DIAGNOSIS — Z83.71 FH: COLON POLYPS: ICD-10-CM

## 2022-06-20 DIAGNOSIS — D12.6 ADENOMATOUS POLYP OF COLON, UNSPECIFIED PART OF COLON: ICD-10-CM

## 2022-06-20 DIAGNOSIS — R10.10 PAIN OF UPPER ABDOMEN: ICD-10-CM

## 2022-06-20 DIAGNOSIS — R14.3 EXCESSIVE GAS: ICD-10-CM

## 2022-06-20 DIAGNOSIS — K58.0 IRRITABLE BOWEL SYNDROME WITH DIARRHEA: Primary | ICD-10-CM

## 2022-06-20 PROCEDURE — 99214 OFFICE O/P EST MOD 30 MIN: CPT | Performed by: NURSE PRACTITIONER

## 2022-06-20 NOTE — PROGRESS NOTES
Chief Complaint   Patient presents with   • Diarrhea       Amadeo Leary is a  56 y.o. male here for a follow up visit for diarrhea.    HPI  56-year-old male presents today for follow-up visit for diarrhea.  He is a patient of Dr. Gan.  He was last seen for screening colonoscopy on 1/12/2022.  He is new to me today.  He is scheduled for a flexible sigmoidoscopy with Dr. Bergeron on 8/31/2022.  He tells me the diarrhea is definitely worse after meals.  He had a flex sig with Dr. Bergeron this past February that showed a 18 mm rectal polyp which she removed.  Path was positive for tubular adenoma.  Patient has a family history with colon polyps.  He also has a family history of celiac disease.  Tells me multiple family members have celiac disease.  He is taking daily probiotics.  He tells me the diarrhea seems worse with certain foods.  He has had a lot of gas and a lot of abdominal pain and cramping with it.  He did celiac blood test which was negative.  He tells me the diarrhea has been ongoing now for at least 5 years.  It is just really getting worse lately.  To the point that he can even go out and about without worrying about where the closest bathroom is.  He denies any dysphagia, reflux, nausea and vomiting, constipation, rectal bleeding or melena.  He admits his appetite is good and his weight is stable.  Past Medical History:   Diagnosis Date   • Abnormal cardiovascular stress test 03/16/2016   • Abnormal EKG 03/2015   • Allergic rhinitis    • Anxiety    • Colon polyps     FOLLOWED BY DR. KELSEY BERGERON   • Depression    • Eardrum rupture, left 11/2016   • Elevated LDL cholesterol level 03/2015   • Hemorrhoids    • Rectal mass 01/2022    BENIGN POLYPOID MASS, FOLLOWED BY DR. KELSEY BERGERON   • Seasonal allergies        Past Surgical History:   Procedure Laterality Date   • CARDIAC CATHETERIZATION Left 03/12/2015    Normal coronary arteries, normal left ventricular systolic function.DR. CRISTINA HARMAN AT [facility]   •  COLONOSCOPY N/A 08/26/2016    1 TUBULAR ADENOMA POLYP IN TRANSVERSE, DR. CLAY MORALES AT [facility]   • COLONOSCOPY N/A 01/12/2022    1 TUBULAR ADENOMA POLYP AT 80CM, 1 TUBULAR ADENOMA POLYP IN CECUM, 1 BENIGN POLYP IN RECTUM, 8 MM POLYPOID MASS IN RECTUM ABOVE HEMORRHOIDS, PATH:BENIGN, MILD INTERNAL HEMORRHOIDS, DR. MALCOLM CONNELL AT [facility]   • COLONOSCOPY N/A 11/10/2006    DR. JEREMY PALMER AT [place]   • EAR TUBE REMOVAL Left 03/21/2017    DR. MARK SEVERTSON   • EAR TUBE REMOVAL Left 06/06/2017    DR. MARK SEVERTSON   • EAR TUBES Bilateral 2003   • MYRINGOPLASTY Left 03/21/2017    WITH BILATERAL EAR TUBES, DR. MARK SEVERTSON   • SHOULDER ARTHROSCOPY W/ LABRAL REPAIR Left 08/28/2020    WITH CHONDROPLASTY, ACROMIOPLASTY, AND EXCISION OF CALCIFIC DEPOSIT, DR. NALLELY LUGO   • SIGMOIDOSCOPY N/A 02/24/2022    18 MM TUBULAR ADENOMA POLYP IN DISTAL RECTUM, RESCOPE IN 1 YR, DR. KELSEY BLANCO AT [facility]       Scheduled Meds:    Continuous Infusions:No current facility-administered medications for this visit.      PRN Meds:.    Allergies   Allergen Reactions   • Levaquin [Levofloxacin In D5w] Itching       Social History     Socioeconomic History   • Marital status:    Tobacco Use   • Smoking status: Never Smoker   • Smokeless tobacco: Never Used   Vaping Use   • Vaping Use: Never used   Substance and Sexual Activity   • Alcohol use: Yes     Comment: occasionally   • Drug use: No   • Sexual activity: Yes     Partners: Female     Comment:         Family History   Problem Relation Age of Onset   • Heart attack Father         Age 47   Heavy smoker    • Heart disease Father    • Colon polyps Mother    • Colon cancer Neg Hx    • Prostate cancer Neg Hx    • Malig Hyperthermia Neg Hx        Review of Systems   Constitutional: Negative for appetite change, chills, diaphoresis, fatigue, fever and unexpected weight change.   HENT: Negative for nosebleeds, postnasal drip, sore throat, trouble swallowing and voice change.     Respiratory: Negative for cough, choking, chest tightness, shortness of breath, wheezing and stridor.    Cardiovascular: Negative for chest pain, palpitations and leg swelling.   Gastrointestinal: Positive for abdominal distention, abdominal pain and diarrhea. Negative for anal bleeding, blood in stool, constipation, nausea, rectal pain and vomiting.   Endocrine: Negative for polydipsia, polyphagia and polyuria.   Musculoskeletal: Negative for gait problem.   Skin: Negative for rash and wound.   Allergic/Immunologic: Negative for food allergies.   Neurological: Negative for dizziness, speech difficulty and light-headedness.   Psychiatric/Behavioral: Negative for confusion, self-injury, sleep disturbance and suicidal ideas.       Vitals:    06/20/22 0903   BP: 112/68   Pulse: 51   Temp: 96.5 °F (35.8 °C)       Physical Exam  Constitutional:       General: He is not in acute distress.     Appearance: He is well-developed. He is not ill-appearing.   HENT:      Head: Normocephalic.   Eyes:      Pupils: Pupils are equal, round, and reactive to light.   Cardiovascular:      Rate and Rhythm: Normal rate and regular rhythm.      Heart sounds: Normal heart sounds.   Pulmonary:      Effort: Pulmonary effort is normal.      Breath sounds: Normal breath sounds.   Abdominal:      General: Bowel sounds are normal. There is distension.      Palpations: Abdomen is soft. There is no mass.      Tenderness: There is no abdominal tenderness. There is no guarding or rebound.      Hernia: No hernia is present.   Musculoskeletal:         General: Normal range of motion.   Skin:     General: Skin is warm and dry.   Neurological:      Mental Status: He is alert and oriented to person, place, and time.   Psychiatric:         Speech: Speech normal.         Behavior: Behavior normal.         Judgment: Judgment normal.         No radiology results for the last 7 days     Diagnoses and all orders for this visit:    1. Irritable bowel syndrome  with diarrhea (Primary)  -     US Gallbladder; Future  -     NM HIDA Scan With Pharmacological Intervention; Future  -     riFAXIMin (Xifaxan) 550 MG tablet; Take 1 tablet by mouth Every 8 (Eight) Hours.  Dispense: 42 tablet; Refill: 2    2. Pain of upper abdomen  -     US Gallbladder; Future  -     NM HIDA Scan With Pharmacological Intervention; Future    3. Excessive gas  -     US Gallbladder; Future  -     NM HIDA Scan With Pharmacological Intervention; Future    4. Adenomatous polyp of colon, unspecified part of colon  Overview:  Added automatically from request for surgery [id number]    Orders:  -     US Gallbladder; Future  -     NM HIDA Scan With Pharmacological Intervention; Future    5. FH: colon polyps      Reviewed most recent flexible sigmoidoscopy results with him today.  He goes back to Dr. Sherley Bergeron for monitoring with another flex sig in August so she can get another look at that area that should where she removed the large rectal adenomatous polyp.  Worsening diarrhea over the past 5 years.  Having the occasional normal stool.  We will go ahead and check his gallbladder.  We will start him on Xifaxan to rule out IBS-D/SIBO.  Patient to continue to monitor his diet.  Patient to call the office in 2 weeks with an update.  Patient to follow-up with me in 3 weeks.  Patient is agreeable to the plan.

## 2022-07-11 ENCOUNTER — TELEPHONE (OUTPATIENT)
Dept: GASTROENTEROLOGY | Facility: CLINIC | Age: 57
End: 2022-07-11

## 2022-07-11 ENCOUNTER — OFFICE VISIT (OUTPATIENT)
Dept: GASTROENTEROLOGY | Facility: CLINIC | Age: 57
End: 2022-07-11

## 2022-07-11 VITALS
HEIGHT: 72 IN | HEART RATE: 76 BPM | WEIGHT: 231 LBS | BODY MASS INDEX: 31.29 KG/M2 | TEMPERATURE: 96.1 F | SYSTOLIC BLOOD PRESSURE: 125 MMHG | DIASTOLIC BLOOD PRESSURE: 56 MMHG

## 2022-07-11 DIAGNOSIS — K62.1 RECTAL POLYP: ICD-10-CM

## 2022-07-11 DIAGNOSIS — K58.0 IRRITABLE BOWEL SYNDROME WITH DIARRHEA: Primary | ICD-10-CM

## 2022-07-11 DIAGNOSIS — Z83.71 FH: COLON POLYPS: ICD-10-CM

## 2022-07-11 DIAGNOSIS — D12.6 TUBULAR ADENOMA OF COLON: Chronic | ICD-10-CM

## 2022-07-11 PROCEDURE — 99214 OFFICE O/P EST MOD 30 MIN: CPT | Performed by: NURSE PRACTITIONER

## 2022-07-11 NOTE — PROGRESS NOTES
Chief Complaint   Patient presents with   • Irritable Bowel Syndrome       Malcolm Leary is a  56 y.o. male here for a follow up visit for IBS.    HPI  56-year-old male presents today for follow-up visit for IBS-D.  He is a patient of Dr. Connell.  He was last seen in the office by me on 6/20/2022.  Last colonoscopy was on 1/12/2022.  He is scheduled for repeat flexible sigmoidoscopy with Dr. Sherley Bergeron on 8/31/2022.  He had a large adenomatous rectal polyp removed last time with Dr. Connell and he referred the patient on over to Dr. Sherley Bergeron for further evaluation. He is happy report since taking the first round of Xifaxan he is doing much better.  He still not 100% but definitely better.  He does have a history of adenomatous colon polyps.  He also has a family history of colon polyps.  He denies any dysphagia, reflux, nausea and vomiting, rectal bleeding or melena.  He admits his appetite is good and his weight appears stable.  Past Medical History:   Diagnosis Date   • Abnormal cardiovascular stress test 03/16/2016   • Abnormal EKG 03/2015   • Allergic rhinitis    • Anxiety    • Colon polyps     FOLLOWED BY DR. KELSEY BERGERON   • Depression    • Eardrum rupture, left 11/2016   • Elevated LDL cholesterol level 03/2015   • Hemorrhoids    • Rectal mass 01/2022    BENIGN POLYPOID MASS, FOLLOWED BY DR. KELSEY BERGERON   • Seasonal allergies        Past Surgical History:   Procedure Laterality Date   • CARDIAC CATHETERIZATION Left 03/12/2015    Normal coronary arteries, normal left ventricular systolic function.DR. CRISTINA HARMAN AT [facility]   • COLONOSCOPY N/A 08/26/2016    1 TUBULAR ADENOMA POLYP IN TRANSVERSE, DR. CLAY MORALES AT [facility]   • COLONOSCOPY N/A 01/12/2022    1 TUBULAR ADENOMA POLYP AT 80CM, 1 TUBULAR ADENOMA POLYP IN CECUM, 1 BENIGN POLYP IN RECTUM, 8 MM POLYPOID MASS IN RECTUM ABOVE HEMORRHOIDS, PATH:BENIGN, MILD INTERNAL HEMORRHOIDS, DR. MALCOLM CONNELL AT [facility]   • COLONOSCOPY N/A 11/10/2006    DR. JEREMY PALMER  AT [place]   • EAR TUBE REMOVAL Left 03/21/2017    DR. MARK SEVERTSON   • EAR TUBE REMOVAL Left 06/06/2017    DR. MARK SEVERTSON   • EAR TUBES Bilateral 2003   • MYRINGOPLASTY Left 03/21/2017    WITH BILATERAL EAR TUBES, DR. MARK SEVERTSON   • SHOULDER ARTHROSCOPY W/ LABRAL REPAIR Left 08/28/2020    WITH CHONDROPLASTY, ACROMIOPLASTY, AND EXCISION OF CALCIFIC DEPOSIT, DR. NALLELY LUGO   • SIGMOIDOSCOPY N/A 02/24/2022    18 MM TUBULAR ADENOMA POLYP IN DISTAL RECTUM, RESCOPE IN 1 YR, DR. KELSEY BLANCO AT [facility]       Scheduled Meds:    Continuous Infusions:No current facility-administered medications for this visit.      PRN Meds:.    Allergies   Allergen Reactions   • Levaquin [Levofloxacin In D5w] Itching       Social History     Socioeconomic History   • Marital status:    Tobacco Use   • Smoking status: Never Smoker   • Smokeless tobacco: Never Used   Vaping Use   • Vaping Use: Never used   Substance and Sexual Activity   • Alcohol use: Yes     Alcohol/week: 3.0 standard drinks     Types: 1 Cans of beer, 2 Drinks containing 0.5 oz of alcohol per week     Comment: occasionally   • Drug use: No   • Sexual activity: Yes     Partners: Female     Comment:         Family History   Problem Relation Age of Onset   • Heart attack Father         Age 47   Heavy smoker    • Heart disease Father    • Colon polyps Mother    • Colon cancer Neg Hx    • Prostate cancer Neg Hx    • Malig Hyperthermia Neg Hx        Review of Systems   Constitutional: Negative for appetite change, chills, diaphoresis, fatigue, fever and unexpected weight change.   HENT: Negative for nosebleeds, postnasal drip, sore throat, trouble swallowing and voice change.    Respiratory: Negative for cough, choking, chest tightness, shortness of breath, wheezing and stridor.    Cardiovascular: Negative for chest pain, palpitations and leg swelling.   Gastrointestinal: Positive for abdominal distention and diarrhea. Negative for abdominal pain, anal  bleeding, blood in stool, constipation, nausea, rectal pain and vomiting.   Endocrine: Negative for polydipsia, polyphagia and polyuria.   Musculoskeletal: Negative for gait problem.   Skin: Negative for rash and wound.   Allergic/Immunologic: Negative for food allergies.   Neurological: Negative for dizziness, speech difficulty and light-headedness.   Psychiatric/Behavioral: Negative for confusion, self-injury, sleep disturbance and suicidal ideas.       Vitals:    07/11/22 1336   BP: 125/56   Pulse: 76   Temp: 96.1 °F (35.6 °C)       Physical Exam  Constitutional:       General: He is not in acute distress.     Appearance: He is well-developed. He is not ill-appearing.   HENT:      Head: Normocephalic.   Eyes:      Pupils: Pupils are equal, round, and reactive to light.   Cardiovascular:      Rate and Rhythm: Normal rate and regular rhythm.      Heart sounds: Normal heart sounds.   Pulmonary:      Effort: Pulmonary effort is normal.      Breath sounds: Normal breath sounds.   Abdominal:      General: Bowel sounds are normal. There is no distension.      Palpations: Abdomen is soft. There is no mass.      Tenderness: There is no abdominal tenderness. There is no guarding or rebound.      Hernia: No hernia is present.   Musculoskeletal:         General: Normal range of motion.   Skin:     General: Skin is warm and dry.   Neurological:      Mental Status: He is alert and oriented to person, place, and time.   Psychiatric:         Speech: Speech normal.         Behavior: Behavior normal.         Judgment: Judgment normal.         No radiology results for the last 7 days     Diagnoses and all orders for this visit:    1. Irritable bowel syndrome with diarrhea (Primary)    2. Tubular adenoma of colon  Overview:  8/2016      3. Rectal polyp  Overview:  Added automatically from request for surgery [id number]      4. FH: colon polyps      Diarrhea seems much better controlled after 1 round of Xifaxan.  Go ahead and start on  round 2.  Refills given today.  Patient to follow-up with Dr. Sherley Bergeron for flexible sigmoidoscopy next month.  Patient to call the office in 2 weeks with an update.  Patient to follow-up with me in 1 month.  Patient is agreeable to the plan.

## 2022-07-11 NOTE — TELEPHONE ENCOUNTER
Call from pt.  On his way to today's office.  Caught in traffic.  Hopes to arrive in 10-15 min.     Update Aicha and Eufemia.

## 2022-07-19 ENCOUNTER — OFFICE VISIT (OUTPATIENT)
Dept: INTERNAL MEDICINE | Facility: CLINIC | Age: 57
End: 2022-07-19

## 2022-07-19 VITALS
BODY MASS INDEX: 31.02 KG/M2 | TEMPERATURE: 98.2 F | HEIGHT: 72 IN | OXYGEN SATURATION: 98 % | WEIGHT: 229 LBS | SYSTOLIC BLOOD PRESSURE: 110 MMHG | DIASTOLIC BLOOD PRESSURE: 70 MMHG | HEART RATE: 54 BPM

## 2022-07-19 DIAGNOSIS — Z12.5 SCREENING PSA (PROSTATE SPECIFIC ANTIGEN): ICD-10-CM

## 2022-07-19 DIAGNOSIS — E66.09 CLASS 1 OBESITY DUE TO EXCESS CALORIES WITHOUT SERIOUS COMORBIDITY WITH BODY MASS INDEX (BMI) OF 31.0 TO 31.9 IN ADULT: ICD-10-CM

## 2022-07-19 DIAGNOSIS — J30.1 ALLERGIC RHINITIS DUE TO POLLEN, UNSPECIFIED SEASONALITY: Chronic | ICD-10-CM

## 2022-07-19 DIAGNOSIS — Z00.00 ANNUAL PHYSICAL EXAM: Primary | ICD-10-CM

## 2022-07-19 DIAGNOSIS — F41.9 ANXIETY AND DEPRESSION: Chronic | ICD-10-CM

## 2022-07-19 DIAGNOSIS — K58.0 IRRITABLE BOWEL SYNDROME WITH DIARRHEA: Chronic | ICD-10-CM

## 2022-07-19 DIAGNOSIS — R53.83 OTHER FATIGUE: ICD-10-CM

## 2022-07-19 DIAGNOSIS — H90.72 MIXED CONDUCTIVE AND SENSORINEURAL HEARING LOSS OF LEFT EAR WITH UNRESTRICTED HEARING OF RIGHT EAR: Chronic | ICD-10-CM

## 2022-07-19 DIAGNOSIS — F32.A ANXIETY AND DEPRESSION: Chronic | ICD-10-CM

## 2022-07-19 PROBLEM — E66.811 CLASS 1 OBESITY DUE TO EXCESS CALORIES WITHOUT SERIOUS COMORBIDITY WITH BODY MASS INDEX (BMI) OF 31.0 TO 31.9 IN ADULT: Status: ACTIVE | Noted: 2022-07-19

## 2022-07-19 PROCEDURE — 99396 PREV VISIT EST AGE 40-64: CPT | Performed by: NURSE PRACTITIONER

## 2022-07-19 PROCEDURE — 90471 IMMUNIZATION ADMIN: CPT | Performed by: NURSE PRACTITIONER

## 2022-07-19 PROCEDURE — 90715 TDAP VACCINE 7 YRS/> IM: CPT | Performed by: NURSE PRACTITIONER

## 2022-07-19 NOTE — ASSESSMENT & PLAN NOTE
Encouraged 150-300 minutes weekly exercise.   Continue with healthy diet choices.   Labs today.   Tdap today.   Continue with monthly self testicular examinations.   Anticipatory guidance given regarding health prevention/wellness, diet/exercise, tobacco/alcohol/drug education, exercise and wellbeing, covid 19 guidance, and sexual health/STD education.

## 2022-07-19 NOTE — PROGRESS NOTES
"Chief Complaint  Annual Exam    Subjective        Amadeo Leary presents to [facility]  History of Present Illness  This is a 57 y/o male presenting to office for annual physical.     Patient reports current exercise. Patient reports attempting to follow healthy diet.     Patient denies tobacco use. Patient reports rare alcohol use.     Patient continues following with Dr. Gan's group for IBS-D and some hx of adenoma of colon. Patient continues on xifaxan for this.     Patient continues on zoloft for anxiety and depression-- patient reports overall mood is stable. Denies any suicidal ideation.     Patient recently got hearing aid for left ear. He has started wearing this intermittently.     Objective   Vital Signs:  /70   Pulse 54   Temp 98.2 °F (36.8 °C)   Ht 182.9 cm (72\")   Wt 104 kg (229 lb)   SpO2 98%   BMI 31.06 kg/m²   Estimated body mass index is 31.06 kg/m² as calculated from the following:    Height as of this encounter: 182.9 cm (72\").    Weight as of this encounter: 104 kg (229 lb).          Physical Exam  Vitals and nursing note reviewed.   Constitutional:       Appearance: Normal appearance.   HENT:      Head: Normocephalic and atraumatic.      Nose: Nose normal.      Mouth/Throat:      Mouth: Mucous membranes are moist.   Eyes:      Extraocular Movements: Extraocular movements intact.      Conjunctiva/sclera: Conjunctivae normal.      Pupils: Pupils are equal, round, and reactive to light.   Cardiovascular:      Rate and Rhythm: Normal rate and regular rhythm.      Pulses: Normal pulses.      Heart sounds: Normal heart sounds. No murmur heard.    No friction rub. No gallop.   Pulmonary:      Effort: Pulmonary effort is normal. No respiratory distress.      Breath sounds: Normal breath sounds. No stridor. No wheezing, rhonchi or rales.   Abdominal:      General: Bowel sounds are normal. There is no distension.      Palpations: Abdomen is soft. There is no " mass.      Tenderness: There is no abdominal tenderness.   Musculoskeletal:         General: No swelling or deformity. Normal range of motion.      Cervical back: Normal range of motion and neck supple.   Skin:     General: Skin is warm and dry.      Coloration: Skin is not jaundiced.      Findings: No bruising.   Neurological:      Mental Status: He is alert and oriented to person, place, and time. Mental status is at baseline.   Psychiatric:         Mood and Affect: Mood normal.         Thought Content: Thought content normal.         Judgment: Judgment normal.        Result Review :  The following data was reviewed by: KEELEY Rivera on 07/19/2022:  Common labs    Common Labsle 1/12/22 1/12/22    1017 1017   Glucose  100 (A)   BUN  13   Creatinine  1.00   eGFR Non African Am  77   Sodium  141   Potassium  4.1   Chloride  104   Calcium  9.0   Albumin  3.90   Total Bilirubin  0.6   Alkaline Phosphatase  42   AST (SGOT)  23   ALT (SGPT)  35   WBC 4.65    Hemoglobin 14.8    Hematocrit 42.5    Platelets 194    (A) Abnormal value                     Assessment and Plan   Diagnoses and all orders for this visit:    1. Annual physical exam (Primary)  Assessment & Plan:  Encouraged 150-300 minutes weekly exercise.   Continue with healthy diet choices.   Labs today.   Tdap today.   Continue with monthly self testicular examinations.   Anticipatory guidance given regarding health prevention/wellness, diet/exercise, tobacco/alcohol/drug education, exercise and wellbeing, covid 19 guidance, and sexual health/STD education.       Orders:  -     CBC & Differential  -     Comprehensive metabolic panel  -     Hemoglobin A1c  -     TSH Rfx On Abnormal To Free T4  -     Lipid panel  -     Tdap Vaccine Greater Than or Equal To 6yo IM    2. Anxiety and depression  Assessment & Plan:  Patient's depression is recurrent and is mild without psychosis. Their depression is currently in full remission and the condition is improving  with treatment. This will be reassessed at the next regular appointment. F/U as described:patient will continue current medication therapy.      3. Irritable bowel syndrome with diarrhea  Assessment & Plan:  Continues on rifaximin.   Following with gastroenterology.       4. Mixed conductive and sensorineural hearing loss of left ear with unrestricted hearing of right ear  Assessment & Plan:  Has hearing aid.       5. Allergic rhinitis due to pollen, unspecified seasonality  Assessment & Plan:  Flonase PRN.   Antihistamine PRN.       6. Class 1 obesity due to excess calories without serious comorbidity with body mass index (BMI) of 31.0 to 31.9 in adult  Assessment & Plan:  BMI is >= 30 and <35. (Class 1 Obesity). The following options were offered after discussion;: exercise counseling/recommendations and nutrition counseling/recommendations      7. Other fatigue  Assessment & Plan:  Check Testosterone levels today.     Orders:  -     Testosterone, Free, Total    8. Screening PSA (prostate specific antigen)  -     PSA SCREENING           Follow Up   Return in about 1 year (around 7/19/2023) for Annual physical.  Patient was given instructions and counseling regarding his condition or for health maintenance advice. Please see specific information pulled into the AVS if appropriate.

## 2022-07-19 NOTE — ASSESSMENT & PLAN NOTE
BMI is >= 30 and <35. (Class 1 Obesity). The following options were offered after discussion;: exercise counseling/recommendations and nutrition counseling/recommendations

## 2022-07-22 ENCOUNTER — TELEPHONE (OUTPATIENT)
Dept: GASTROENTEROLOGY | Facility: CLINIC | Age: 57
End: 2022-07-22

## 2022-07-22 ENCOUNTER — HOSPITAL ENCOUNTER (OUTPATIENT)
Dept: NUCLEAR MEDICINE | Facility: HOSPITAL | Age: 57
Discharge: HOME | End: 2022-07-22

## 2022-07-22 ENCOUNTER — HOSPITAL ENCOUNTER (OUTPATIENT)
Dept: ULTRASOUND IMAGING | Facility: HOSPITAL | Age: 57
Discharge: HOME | End: 2022-07-22
Admitting: NURSE PRACTITIONER

## 2022-07-22 DIAGNOSIS — R10.10 PAIN OF UPPER ABDOMEN: ICD-10-CM

## 2022-07-22 DIAGNOSIS — D12.6 ADENOMATOUS POLYP OF COLON, UNSPECIFIED PART OF COLON: ICD-10-CM

## 2022-07-22 DIAGNOSIS — K58.0 IRRITABLE BOWEL SYNDROME WITH DIARRHEA: ICD-10-CM

## 2022-07-22 DIAGNOSIS — R14.3 EXCESSIVE GAS: ICD-10-CM

## 2022-07-22 LAB
ALBUMIN SERPL-MCNC: 4.5 G/DL (ref 3.8–4.9)
ALBUMIN/GLOB SERPL: 1.9 {RATIO} (ref 1.2–2.2)
ALP SERPL-CCNC: 54 IU/L (ref 44–121)
ALT SERPL-CCNC: 39 IU/L (ref 0–44)
AST SERPL-CCNC: 28 IU/L (ref 0–40)
BASOPHILS # BLD AUTO: 0 X10E3/UL (ref 0–0.2)
BASOPHILS NFR BLD AUTO: 1 %
BILIRUB SERPL-MCNC: 0.5 MG/DL (ref 0–1.2)
BUN SERPL-MCNC: 21 MG/DL (ref 6–24)
BUN/CREAT SERPL: 18 (ref 9–20)
CALCIUM SERPL-MCNC: 9.7 MG/DL (ref 8.7–10.2)
CHLORIDE SERPL-SCNC: 101 MMOL/L (ref 96–106)
CHOLEST SERPL-MCNC: 201 MG/DL (ref 100–199)
CO2 SERPL-SCNC: 26 MMOL/L (ref 20–29)
CREAT SERPL-MCNC: 1.15 MG/DL (ref 0.76–1.27)
EGFRCR SERPLBLD CKD-EPI 2021: 74 ML/MIN/1.73
EOSINOPHIL # BLD AUTO: 0.1 X10E3/UL (ref 0–0.4)
EOSINOPHIL NFR BLD AUTO: 2 %
ERYTHROCYTE [DISTWIDTH] IN BLOOD BY AUTOMATED COUNT: 13 % (ref 11.6–15.4)
GLOBULIN SER CALC-MCNC: 2.4 G/DL (ref 1.5–4.5)
GLUCOSE SERPL-MCNC: 100 MG/DL (ref 65–99)
HBA1C MFR BLD: 6 % (ref 4.8–5.6)
HCT VFR BLD AUTO: 49 % (ref 37.5–51)
HDLC SERPL-MCNC: 46 MG/DL
HGB BLD-MCNC: 16.5 G/DL (ref 13–17.7)
IMM GRANULOCYTES # BLD AUTO: 0 X10E3/UL (ref 0–0.1)
IMM GRANULOCYTES NFR BLD AUTO: 1 %
LDLC SERPL CALC-MCNC: 145 MG/DL (ref 0–99)
LYMPHOCYTES # BLD AUTO: 1.8 X10E3/UL (ref 0.7–3.1)
LYMPHOCYTES NFR BLD AUTO: 34 %
MCH RBC QN AUTO: 30.2 PG (ref 26.6–33)
MCHC RBC AUTO-ENTMCNC: 33.7 G/DL (ref 31.5–35.7)
MCV RBC AUTO: 90 FL (ref 79–97)
MONOCYTES # BLD AUTO: 0.5 X10E3/UL (ref 0.1–0.9)
MONOCYTES NFR BLD AUTO: 9 %
NEUTROPHILS # BLD AUTO: 2.9 X10E3/UL (ref 1.4–7)
NEUTROPHILS NFR BLD AUTO: 53 %
PLATELET # BLD AUTO: 214 X10E3/UL (ref 150–450)
POTASSIUM SERPL-SCNC: 4.8 MMOL/L (ref 3.5–5.2)
PROT SERPL-MCNC: 6.9 G/DL (ref 6–8.5)
PSA SERPL-MCNC: 1.3 NG/ML (ref 0–4)
RBC # BLD AUTO: 5.46 X10E6/UL (ref 4.14–5.8)
SODIUM SERPL-SCNC: 139 MMOL/L (ref 134–144)
TESTOST FREE SERPL-MCNC: 5.9 PG/ML (ref 7.2–24)
TESTOST SERPL-MCNC: 471 NG/DL (ref 264–916)
TRIGL SERPL-MCNC: 56 MG/DL (ref 0–149)
TSH SERPL DL<=0.005 MIU/L-ACNC: 1.88 UIU/ML (ref 0.45–4.5)
VLDLC SERPL CALC-MCNC: 10 MG/DL (ref 5–40)
WBC # BLD AUTO: 5.4 X10E3/UL (ref 3.4–10.8)

## 2022-07-22 PROCEDURE — 78226 HEPATOBILIARY SYSTEM IMAGING: CPT

## 2022-07-22 PROCEDURE — A9537 TC99M MEBROFENIN: HCPCS | Performed by: NURSE PRACTITIONER

## 2022-07-22 PROCEDURE — 0 TECHNETIUM TC 99M MEBROFENIN KIT: Performed by: NURSE PRACTITIONER

## 2022-07-22 PROCEDURE — 76705 ECHO EXAM OF ABDOMEN: CPT

## 2022-07-22 RX ORDER — KIT FOR THE PREPARATION OF TECHNETIUM TC 99M MEBROFENIN 45 MG/10ML
1 INJECTION, POWDER, LYOPHILIZED, FOR SOLUTION INTRAVENOUS
Status: COMPLETED | OUTPATIENT
Start: 2022-07-22 | End: 2022-07-22

## 2022-07-22 RX ADMIN — MEBROFENIN 1 DOSE: 45 INJECTION, POWDER, LYOPHILIZED, FOR SOLUTION INTRAVENOUS at 08:23

## 2022-07-22 NOTE — TELEPHONE ENCOUNTER
----- Message from KEELEY Hurt sent at 7/22/2022  2:29 PM EDT -----  Please call the patient and let him know his HIDA scan was normal.  Thanks

## 2022-07-26 ENCOUNTER — TELEPHONE (OUTPATIENT)
Dept: GASTROENTEROLOGY | Facility: CLINIC | Age: 57
End: 2022-07-26

## 2022-07-26 NOTE — TELEPHONE ENCOUNTER
----- Message from KEELEY Hurt sent at 7/25/2022  3:31 PM EDT -----  Please call the patient and let him know his ultrasound was normal.  Thanks

## 2022-08-23 DIAGNOSIS — F32.A ANXIETY AND DEPRESSION: ICD-10-CM

## 2022-08-23 DIAGNOSIS — F41.9 ANXIETY AND DEPRESSION: ICD-10-CM

## 2022-08-30 ENCOUNTER — TELEPHONE (OUTPATIENT)
Dept: GASTROENTEROLOGY | Facility: CLINIC | Age: 57
End: 2022-08-30

## 2022-08-30 NOTE — TELEPHONE ENCOUNTER
----- Message from Amadeo Leary sent at 8/30/2022 11:04 AM EDT -----  Regarding: Follow up visit  Aicha,  Hope all is well with you.   I completed the 4 weeks of antibiotic ~ 8/12/22.  After several weeks of success without many IBSD issues, the episodes have recently returned.  I still have not seen a pattern as to what is directly causing the issue, but wanted to come in again and talk about the next level of your strategic plan.  Is there something to be done outside of the office or would you rather have a face to face again and generate the path forward ?

## 2022-08-30 NOTE — TELEPHONE ENCOUNTER
Called pt and advised of Aicha SALAZAR's note. Pt verb understanding and reports that he has had 4 rounds of xifaxan.      F/u appt made for 09/01 at 1015a with Aicha SALAZAR. Update sent to Aicha SALAZAR.

## 2022-08-30 NOTE — TELEPHONE ENCOUNTER
Please find out if he had 3 full rounds of the Xifaxan?  Because his note said 4 weeks so I am worried maybe he just had 2 rounds?  Yes I would recommend a follow-up office visit with me to discuss neck steps if he is already had 4 rounds of Xifaxan.  Thanks

## 2022-08-31 ENCOUNTER — ANESTHESIA EVENT (OUTPATIENT)
Dept: GASTROENTEROLOGY | Facility: HOSPITAL | Age: 57
End: 2022-08-31

## 2022-08-31 ENCOUNTER — HOSPITAL ENCOUNTER (OUTPATIENT)
Facility: HOSPITAL | Age: 57
Setting detail: HOSPITAL OUTPATIENT SURGERY
Discharge: HOME | End: 2022-08-31
Attending: COLON & RECTAL SURGERY | Admitting: COLON & RECTAL SURGERY

## 2022-08-31 ENCOUNTER — ANESTHESIA (OUTPATIENT)
Dept: GASTROENTEROLOGY | Facility: HOSPITAL | Age: 57
End: 2022-08-31

## 2022-08-31 VITALS
OXYGEN SATURATION: 97 % | RESPIRATION RATE: 16 BRPM | HEART RATE: 48 BPM | TEMPERATURE: 97.4 F | HEIGHT: 72 IN | SYSTOLIC BLOOD PRESSURE: 108 MMHG | DIASTOLIC BLOOD PRESSURE: 74 MMHG | WEIGHT: 223.5 LBS | BODY MASS INDEX: 30.27 KG/M2

## 2022-08-31 DIAGNOSIS — Z86.010 HISTORY OF COLON POLYPS: ICD-10-CM

## 2022-08-31 PROCEDURE — 25010000002 PROPOFOL 10 MG/ML EMULSION: Performed by: NURSE ANESTHETIST, CERTIFIED REGISTERED

## 2022-08-31 PROCEDURE — 88305 TISSUE EXAM BY PATHOLOGIST: CPT | Performed by: COLON & RECTAL SURGERY

## 2022-08-31 RX ORDER — GLYCOPYRROLATE 0.2 MG/ML
INJECTION INTRAMUSCULAR; INTRAVENOUS AS NEEDED
Status: DISCONTINUED | OUTPATIENT
Start: 2022-08-31 | End: 2022-08-31 | Stop reason: SURG

## 2022-08-31 RX ORDER — SODIUM CHLORIDE, SODIUM LACTATE, POTASSIUM CHLORIDE, CALCIUM CHLORIDE 600; 310; 30; 20 MG/100ML; MG/100ML; MG/100ML; MG/100ML
30 INJECTION, SOLUTION INTRAVENOUS CONTINUOUS PRN
Status: DISCONTINUED | OUTPATIENT
Start: 2022-08-31 | End: 2022-08-31 | Stop reason: HOSPADM

## 2022-08-31 RX ORDER — PROPOFOL 10 MG/ML
VIAL (ML) INTRAVENOUS CONTINUOUS PRN
Status: DISCONTINUED | OUTPATIENT
Start: 2022-08-31 | End: 2022-08-31 | Stop reason: SURG

## 2022-08-31 RX ORDER — PROPOFOL 10 MG/ML
VIAL (ML) INTRAVENOUS AS NEEDED
Status: DISCONTINUED | OUTPATIENT
Start: 2022-08-31 | End: 2022-08-31 | Stop reason: SURG

## 2022-08-31 RX ADMIN — Medication 200 MCG/KG/MIN: at 08:17

## 2022-08-31 RX ADMIN — GLYCOPYRROLATE 0.2 MG: 0.2 INJECTION INTRAMUSCULAR; INTRAVENOUS at 08:19

## 2022-08-31 RX ADMIN — SODIUM CHLORIDE, POTASSIUM CHLORIDE, SODIUM LACTATE AND CALCIUM CHLORIDE 30 ML/HR: 600; 310; 30; 20 INJECTION, SOLUTION INTRAVENOUS at 07:47

## 2022-08-31 RX ADMIN — PROPOFOL 80 MG: 10 INJECTION, EMULSION INTRAVENOUS at 08:16

## 2022-08-31 NOTE — ANESTHESIA PREPROCEDURE EVALUATION
Anesthesia Evaluation                  Airway   Mallampati: I  TM distance: >3 FB  Neck ROM: full  No difficulty expected  Dental - normal exam     Pulmonary - normal exam   Cardiovascular - normal exam        Neuro/Psych  (+) psychiatric history,    GI/Hepatic/Renal/Endo    (+) obesity,       Musculoskeletal     Abdominal  - normal exam    Bowel sounds: normal.   Substance History      OB/GYN          Other                        Anesthesia Plan    ASA 2     MAC     intravenous induction     Anesthetic plan, risks, benefits, and alternatives have been provided, discussed and informed consent has been obtained with: patient.        CODE STATUS:

## 2022-08-31 NOTE — ANESTHESIA POSTPROCEDURE EVALUATION
Patient: Amadeo Leary    Procedure Summary     Date: 08/31/22 Room / Location: [facility] ENDOSCOPY 9 /  [facility]    Anesthesia Start: 0813 Anesthesia Stop: 0838    Procedure: SIGMOIDOSCOPY FLEXIBLE WITH HOT SNARE POLYPECTOMY (N/A ) Diagnosis:       History of colon polyps      (History of colon polyps [Z86.010])    Surgeons: Everett Bergeron MD Provider: Lisandra Aranda MD    Anesthesia Type: MAC ASA Status: 2          Anesthesia Type: MAC    Vitals  Vitals Value Taken Time   /74 08/31/22 0857   Temp 36.3 °C (97.4 °F) 08/31/22 0847   Pulse 48 08/31/22 0857   Resp 16 08/31/22 0857   SpO2 97 % 08/31/22 0857           Post Anesthesia Care and Evaluation    Patient location during evaluation: bedside  Patient participation: complete - patient participated  Level of consciousness: awake  Pain management: adequate    Airway patency: patent  Anesthetic complications: No anesthetic complications    Cardiovascular status: acceptable  Respiratory status: acceptable  Hydration status: acceptable

## 2022-09-01 ENCOUNTER — OFFICE VISIT (OUTPATIENT)
Dept: GASTROENTEROLOGY | Facility: CLINIC | Age: 57
End: 2022-09-01

## 2022-09-01 VITALS — HEIGHT: 72 IN | TEMPERATURE: 97.1 F | BODY MASS INDEX: 30.56 KG/M2 | WEIGHT: 225.6 LBS

## 2022-09-01 DIAGNOSIS — K62.1 RECTAL POLYP: ICD-10-CM

## 2022-09-01 DIAGNOSIS — K58.0 IRRITABLE BOWEL SYNDROME WITH DIARRHEA: Primary | ICD-10-CM

## 2022-09-01 DIAGNOSIS — D12.6 TUBULAR ADENOMA OF COLON: Chronic | ICD-10-CM

## 2022-09-01 LAB
LAB AP CASE REPORT: NORMAL
PATH REPORT.FINAL DX SPEC: NORMAL
PATH REPORT.GROSS SPEC: NORMAL

## 2022-09-01 PROCEDURE — 99214 OFFICE O/P EST MOD 30 MIN: CPT | Performed by: NURSE PRACTITIONER

## 2022-09-01 RX ORDER — MONTELUKAST SODIUM 4 MG/1
TABLET, CHEWABLE ORAL
Qty: 60 TABLET | Refills: 11 | Status: SHIPPED | OUTPATIENT
Start: 2022-09-01

## 2022-09-01 NOTE — H&P
Malcolm Leary is a 56 y.o. male  who is referred by Kelsey Bergeron MD for a colonoscopy. He   has an indications: Status post flexible sigmoidoscopy for large rectal polyp with piecemeal polypectomy  He denies any change in bowel function, melena, or hematochezia.    Past Medical History:   Diagnosis Date   • Abnormal cardiovascular stress test 03/16/2016   • Abnormal EKG 03/2015   • Allergic rhinitis    • Anxiety    • Colon polyps     FOLLOWED BY DR. KELSEY BERGERON   • Depression    • Eardrum rupture, left 11/2016   • Elevated LDL cholesterol level 03/2015   • Hemorrhoids    • Rectal mass 01/2022    BENIGN POLYPOID MASS, FOLLOWED BY DR. KELSEY BERGERON   • Seasonal allergies        Past Surgical History:   Procedure Laterality Date   • CARDIAC CATHETERIZATION Left 03/12/2015    Normal coronary arteries, normal left ventricular systolic function.DR. CRISTINA HARMAN AT [facility]   • COLONOSCOPY N/A 08/26/2016    1 TUBULAR ADENOMA POLYP IN TRANSVERSE, DR. CLAY MORALES AT [facility]   • COLONOSCOPY N/A 01/12/2022    1 TUBULAR ADENOMA POLYP AT 80CM, 1 TUBULAR ADENOMA POLYP IN CECUM, 1 BENIGN POLYP IN RECTUM, 8 MM POLYPOID MASS IN RECTUM ABOVE HEMORRHOIDS, PATH:BENIGN, MILD INTERNAL HEMORRHOIDS, DR. MALCOLM CONNELL AT [facility]   • COLONOSCOPY N/A 11/10/2006    DR. JEREMY PALMER AT [facility]   • EAR TUBE REMOVAL Left 03/21/2017    DR. MARK SEVERTSON   • EAR TUBE REMOVAL Left 06/06/2017    DR. MARK SEVERTSON   • EAR TUBES Bilateral 2003   • MYRINGOPLASTY Left 03/21/2017    WITH BILATERAL EAR TUBES, DR. MARK SEVERTSON   • SHOULDER ARTHROSCOPY W/ LABRAL REPAIR Left 08/28/2020    WITH CHONDROPLASTY, ACROMIOPLASTY, AND EXCISION OF CALCIFIC DEPOSIT, DR. NALLELY LUGO   • SIGMOIDOSCOPY N/A 02/24/2022    18 MM TUBULAR ADENOMA POLYP IN DISTAL RECTUM, RESCOPE IN 1 YR, DR. KELSEY BERGERON AT [facility]   • SIGMOIDOSCOPY N/A 8/31/2022    Procedure: SIGMOIDOSCOPY FLEXIBLE WITH HOT SNARE POLYPECTOMY;  Surgeon: Kelsey Bergeron MD;  Location: [facility] ENDOSCOPY;  Service:  General;  Laterality: N/A;  PRE- HISTORY COLON POLYPS  POST- POLYP       No medications prior to admission.       Allergies   Allergen Reactions   • Levaquin [Levofloxacin In D5w] Itching       Family History   Problem Relation Age of Onset   • Heart attack Father         Age 47   Heavy smoker    • Heart disease Father    • Colon polyps Mother    • Colon cancer Neg Hx    • Prostate cancer Neg Hx    • Malig Hyperthermia Neg Hx        Social History     Socioeconomic History   • Marital status:    Tobacco Use   • Smoking status: Never Smoker   • Smokeless tobacco: Never Used   Vaping Use   • Vaping Use: Never used   Substance and Sexual Activity   • Alcohol use: Yes     Alcohol/week: 3.0 standard drinks     Types: 1 Cans of beer, 2 Drinks containing 0.5 oz of alcohol per week     Comment: occasionally   • Drug use: No   • Sexual activity: Yes     Partners: Female     Comment:         Review of Systems   Gastrointestinal: Negative for abdominal pain, nausea and vomiting.   All other systems reviewed and are negative.      Vitals:    08/31/22 0857   BP: 108/74   Pulse: (!) 48   Resp: 16   Temp:    SpO2: 97%         Physical Exam  Exam conducted with a chaperone present.   Constitutional:       General: He is not in acute distress.     Appearance: He is well-developed.   HENT:      Head: Normocephalic and atraumatic.      Nose: Nose normal.   Eyes:      Conjunctiva/sclera: Conjunctivae normal.      Pupils: Pupils are equal, round, and reactive to light.   Neck:      Trachea: No tracheal deviation.   Pulmonary:      Effort: Pulmonary effort is normal. No respiratory distress.      Breath sounds: Normal breath sounds.   Abdominal:      General: Bowel sounds are normal. There is no distension.      Palpations: Abdomen is soft.   Musculoskeletal:         General: No deformity. Normal range of motion.      Cervical back: Normal range of motion.   Skin:     General: Skin is warm and dry.   Neurological:       Mental Status: He is alert and oriented to person, place, and time.      Cranial Nerves: No cranial nerve deficit.      Coordination: Coordination normal.      Gait: Gait normal.   Psychiatric:         Behavior: Behavior normal.         Judgment: Judgment normal.           Assessment & Plan      indications: Status post flexible sigmoidoscopy for large rectal polyp with piecemeal polypectomy         I recommend colonoscopy.  I described risks, benefits of the procedure with the patient including but not limited to bleeding, infection, possibility of perforation and possible polypectomy. All of the patient's questions were answered and they would like to proceed with the above recommendations.

## 2022-09-01 NOTE — PROGRESS NOTES
Chief Complaint   Patient presents with   • Irritable Bowel Syndrome   • Diarrhea       Amadeo Leary is a  56 y.o. male here for a follow up visit for IBS.    HPI  56-year-old male presents today for follow-up visit for IBS.  He is a patient of Dr. Gan.  He was last seen in the office by me on 7/11/2022.  He has a history of IBS-D/SIBO and admits he did finish all 3 rounds of Xifaxan.  He tells me once he was on the Xifaxan he was about 75% better.  Unfortunately he felt like his IBS symptoms never completely left.  He then admits as soon as he stopped the Xifaxan all of his symptoms would come rushing back.  He tells me he does have days that he has what he considers to be normal bowel movements.  He is trying really hard to figure out his possible triggers.  He thinks maybe he has some food allergies or some intolerances to certain foods like tomatoes and spices.  He denies any dysphagia, reflux, abdominal pain, nausea and vomiting, constipation, rectal bleeding or melena.  He admits his appetite is good and his weight is down 6 pounds since July of this year.  He did undergo a flexible sigmoidoscopy this morning with Dr. Blanco.  He does have a history of multiple adenomatous colon and rectal polyps.  He tells me that his wife told him that Dr. Blanco did find another polyp this morning.  Path is pending.   He does have a GI family history of his mother with colon polyps.  Past Medical History:   Diagnosis Date   • Abnormal cardiovascular stress test 03/16/2016   • Abnormal EKG 03/2015   • Allergic rhinitis    • Anxiety    • Colon polyps     FOLLOWED BY DR. KELSEY BLANCO   • Depression    • Eardrum rupture, left 11/2016   • Elevated LDL cholesterol level 03/2015   • Hemorrhoids    • Rectal mass 01/2022    BENIGN POLYPOID MASS, FOLLOWED BY DR. KELSEY BLANCO   • Seasonal allergies        Past Surgical History:   Procedure Laterality Date   • CARDIAC CATHETERIZATION Left 03/12/2015    Normal coronary arteries,  normal left ventricular systolic function.DR. CRISTINA HARMAN AT [facility]   • COLONOSCOPY N/A 08/26/2016    1 TUBULAR ADENOMA POLYP IN TRANSVERSE, DR. CLAY MORALES AT [facility]   • COLONOSCOPY N/A 01/12/2022    1 TUBULAR ADENOMA POLYP AT 80CM, 1 TUBULAR ADENOMA POLYP IN CECUM, 1 BENIGN POLYP IN RECTUM, 8 MM POLYPOID MASS IN RECTUM ABOVE HEMORRHOIDS, PATH:BENIGN, MILD INTERNAL HEMORRHOIDS, DR. MALCOLM CONNELL AT [facility]   • COLONOSCOPY N/A 11/10/2006    DR. JEREMY PALMER AT [place]   • EAR TUBE REMOVAL Left 03/21/2017    DR. MARK SEVERTSON   • EAR TUBE REMOVAL Left 06/06/2017    DR. MARK SEVERTSON   • EAR TUBES Bilateral 2003   • MYRINGOPLASTY Left 03/21/2017    WITH BILATERAL EAR TUBES, DR. MARK SEVERTSON   • SHOULDER ARTHROSCOPY W/ LABRAL REPAIR Left 08/28/2020    WITH CHONDROPLASTY, ACROMIOPLASTY, AND EXCISION OF CALCIFIC DEPOSIT, DR. NALLELY LUGO   • SIGMOIDOSCOPY N/A 02/24/2022    18 MM TUBULAR ADENOMA POLYP IN DISTAL RECTUM, RESCOPE IN 1 YR, DR. KELSEY BERGERON AT [facility]   • SIGMOIDOSCOPY N/A 8/31/2022    Procedure: SIGMOIDOSCOPY FLEXIBLE WITH HOT SNARE POLYPECTOMY;  Surgeon: Kelsey Bergeron MD;  Location: [facility] ENDOSCOPY;  Service: General;  Laterality: N/A;  PRE- HISTORY COLON POLYPS  POST- POLYP       Scheduled Meds:    Continuous Infusions:No current facility-administered medications for this visit.      PRN Meds:.    Allergies   Allergen Reactions   • Levaquin [Levofloxacin In D5w] Itching       Social History     Socioeconomic History   • Marital status:    Tobacco Use   • Smoking status: Never Smoker   • Smokeless tobacco: Never Used   Vaping Use   • Vaping Use: Never used   Substance and Sexual Activity   • Alcohol use: Yes     Alcohol/week: 3.0 standard drinks     Types: 1 Cans of beer, 2 Drinks containing 0.5 oz of alcohol per week     Comment: occasionally   • Drug use: No   • Sexual activity: Yes     Partners: Female     Comment:         Family History   Problem Relation Age of Onset   • Heart attack Father          Age 47   Heavy smoker    • Heart disease Father    • Colon polyps Mother    • Colon cancer Neg Hx    • Prostate cancer Neg Hx    • Malig Hyperthermia Neg Hx        Review of Systems   Constitutional: Negative for appetite change, chills, diaphoresis, fatigue, fever and unexpected weight change.   HENT: Negative for nosebleeds, postnasal drip, sore throat, trouble swallowing and voice change.    Respiratory: Negative for cough, choking, chest tightness, shortness of breath, wheezing and stridor.    Cardiovascular: Negative for chest pain, palpitations and leg swelling.   Gastrointestinal: Positive for abdominal distention and diarrhea. Negative for abdominal pain, anal bleeding, blood in stool, constipation, nausea, rectal pain and vomiting.   Endocrine: Negative for polydipsia, polyphagia and polyuria.   Musculoskeletal: Negative for gait problem.   Skin: Negative for rash and wound.   Allergic/Immunologic: Negative for food allergies.   Neurological: Negative for dizziness, speech difficulty and light-headedness.   Psychiatric/Behavioral: Negative for confusion, self-injury, sleep disturbance and suicidal ideas.       Vitals:    09/01/22 1504   Temp: 97.1 °F (36.2 °C)       Physical Exam  Constitutional:       General: He is not in acute distress.     Appearance: He is well-developed. He is not ill-appearing.   HENT:      Head: Normocephalic.   Eyes:      Pupils: Pupils are equal, round, and reactive to light.   Cardiovascular:      Rate and Rhythm: Normal rate and regular rhythm.      Heart sounds: Normal heart sounds.   Pulmonary:      Effort: Pulmonary effort is normal.      Breath sounds: Normal breath sounds.   Abdominal:      General: Bowel sounds are normal. There is distension.      Palpations: Abdomen is soft. There is no mass.      Tenderness: There is no abdominal tenderness. There is no guarding or rebound.      Hernia: No hernia is present.   Musculoskeletal:         General: Normal range of  motion.   Skin:     General: Skin is warm and dry.   Neurological:      Mental Status: He is alert and oriented to person, place, and time.   Psychiatric:         Speech: Speech normal.         Behavior: Behavior normal.         Judgment: Judgment normal.         No radiology results for the last 7 days     Diagnoses and all orders for this visit:    1. Irritable bowel syndrome with diarrhea (Primary)  -     Gastrointestinal Panel, PCR - Stool, Per Rectum  -     Clostridioides difficile Toxin, PCR - Stool, Per Rectum  -     Fecal Lactoferrin Qual. - Stool, Per Rectum  -     Pancreatic Elastase, Fecal - Stool, Per Rectum    2. Tubular adenoma of colon  Overview:  8/2016      3. Rectal polyp  Overview:  Added automatically from request for surgery [id number]      Other orders  -     colestipol (COLESTID) 1 g tablet; Start with one pill daily and increase to 2 daily if no improvement after 2 weeks  Dispense: 60 tablet; Refill: 11      Unfortunately the Xifaxan only seemed to help temporarily.  And even at that the patient ranks it may be 75% effective.  Still having episodes of IBS with diarrhea, gas and bloating as well as fecal urgency.  Stomach it could be more food related especially spices and tomatoes.  Patient is continuing to cut those out of his diet.  For now I think it is wise to go ahead and just be thorough and check some stool studies.  We will also start him on colestipol as needed.  Patient to continue high-fiber diet.  Patient to call the office with any issues.  Patient to follow-up with me in 1 month.  Patient to follow-up with Dr. Sherley Bergeron in 1 year.  Patient still waiting on path report from today's flexible sigmoidoscopy with her.  Patient is agreeable to the plan.

## 2022-09-27 ENCOUNTER — TELEPHONE (OUTPATIENT)
Dept: GASTROENTEROLOGY | Facility: CLINIC | Age: 57
End: 2022-09-27

## 2022-09-27 NOTE — TELEPHONE ENCOUNTER
Unable to reach patient. Phone did not ring both times I tried to call it.   We need to see if the patient is planning on proceeding with turning in the stool studies that were ordered on 09/01/2022

## 2022-09-29 DIAGNOSIS — F32.A ANXIETY AND DEPRESSION: ICD-10-CM

## 2022-09-29 DIAGNOSIS — F41.9 ANXIETY AND DEPRESSION: ICD-10-CM

## 2022-10-20 ENCOUNTER — OFFICE VISIT (OUTPATIENT)
Dept: INTERNAL MEDICINE | Facility: CLINIC | Age: 57
End: 2022-10-20

## 2022-10-20 VITALS
SYSTOLIC BLOOD PRESSURE: 128 MMHG | WEIGHT: 226 LBS | OXYGEN SATURATION: 98 % | TEMPERATURE: 97.1 F | HEART RATE: 67 BPM | DIASTOLIC BLOOD PRESSURE: 80 MMHG | HEIGHT: 72 IN | BODY MASS INDEX: 30.61 KG/M2

## 2022-10-20 DIAGNOSIS — R39.198 DIFFICULTY IN URINATION: Primary | ICD-10-CM

## 2022-10-20 PROCEDURE — 99213 OFFICE O/P EST LOW 20 MIN: CPT | Performed by: NURSE PRACTITIONER

## 2022-10-20 RX ORDER — TAMSULOSIN HYDROCHLORIDE 0.4 MG/1
1 CAPSULE ORAL DAILY
Qty: 90 CAPSULE | Refills: 1 | Status: SHIPPED | OUTPATIENT
Start: 2022-10-20

## 2022-10-20 RX ORDER — DEXTROAMPHETAMINE SULFATE, DEXTROAMPHETAMINE SACCHARATE, AMPHETAMINE SULFATE AND AMPHETAMINE ASPARTATE 5; 5; 5; 5 MG/1; MG/1; MG/1; MG/1
20 CAPSULE, EXTENDED RELEASE ORAL EVERY MORNING
COMMUNITY
Start: 2022-09-26

## 2022-10-20 NOTE — PROGRESS NOTES
"Chief Complaint  Difficulty urinating    Subjective        Amadeo Leary presents to [facility]ARE  History of Present Illness  This is a 58 y/o female presenting to office for complaints of difficulty urinating. Patient reports he is having to strain to urinate and is also experiencing dribbling. Patient had recent PSA back in July 2022 which was 1.3. Patient reports having to get up once during the night to urinate as well. Patient denies blood in the urine. Denies any erectile dysfunction. Patient reports he is just having difficulty emptying bladder. Denies any testicular pain or swelling. Denies any current constipation.     Objective   Vital Signs:  /80 (BP Location: Left arm, Patient Position: Sitting, Cuff Size: Adult)   Pulse 67   Temp 97.1 °F (36.2 °C) (Infrared)   Ht 182.9 cm (72\")   Wt 103 kg (226 lb)   SpO2 98%   BMI 30.65 kg/m²   Estimated body mass index is 30.65 kg/m² as calculated from the following:    Height as of this encounter: 182.9 cm (72\").    Weight as of this encounter: 103 kg (226 lb).          Physical Exam  Constitutional:       Appearance: Normal appearance.   HENT:      Head: Normocephalic and atraumatic.      Right Ear: External ear normal.      Left Ear: External ear normal.   Eyes:      Pupils: Pupils are equal, round, and reactive to light.      Comments: +glasses   Cardiovascular:      Rate and Rhythm: Normal rate and regular rhythm.      Pulses: Normal pulses.      Heart sounds: Normal heart sounds. No murmur heard.    No friction rub. No gallop.   Pulmonary:      Effort: Pulmonary effort is normal. No respiratory distress.      Breath sounds: Normal breath sounds. No stridor. No wheezing, rhonchi or rales.   Musculoskeletal:         General: Normal range of motion.      Cervical back: Normal range of motion and neck supple.   Skin:     General: Skin is warm and dry.   Neurological:      General: No focal deficit present.      Mental " Status: He is alert and oriented to person, place, and time. Mental status is at baseline.   Psychiatric:         Mood and Affect: Mood normal.         Thought Content: Thought content normal.         Judgment: Judgment normal.        Result Review :  The following data was reviewed by: KEELEY Rivera on 10/20/2022:  Common labs    Common Labs 1/12/22 1/12/22 7/19/22 7/19/22 7/19/22 7/19/22 7/19/22    1017 1017 0951 0951 0951 0951 0951   Glucose  100 (A)  100 (A)      BUN  13  21      Creatinine  1.00  1.15      eGFR Non African Am  77        Sodium  141  139      Potassium  4.1  4.8      Chloride  104  101      Calcium  9.0  9.7      Total Protein    6.9      Albumin  3.90  4.5      Total Bilirubin  0.6  0.5      Alkaline Phosphatase  42  54      AST (SGOT)  23  28      ALT (SGPT)  35  39      WBC 4.65  5.4       Hemoglobin 14.8  16.5       Hematocrit 42.5  49.0       Platelets 194  214       Total Cholesterol      201 (A)    Triglycerides      56    HDL Cholesterol      46    LDL Cholesterol       145 (A)    Hemoglobin A1C     6.0 (A)     PSA       1.3   (A) Abnormal value       Comments are available for some flowsheets but are not being displayed.                     Assessment and Plan   Diagnoses and all orders for this visit:    1. Difficulty in urination (Primary)  Assessment & Plan:  PSA normal 1.3 back in July 2022.   Would like to wait to see urology.   Flomax daily.   Discussed if symptoms do not improve or worsen, to notify provider for referral to urology. .      Orders:  -     tamsulosin (FLOMAX) 0.4 MG capsule 24 hr capsule; Take 1 capsule by mouth Daily.  Dispense: 90 capsule; Refill: 1           Follow Up   Return for Next scheduled follow up 7/20/22.  Patient was given instructions and counseling regarding his condition or for health maintenance advice. Please see specific information pulled into the AVS if appropriate.

## 2022-10-20 NOTE — ASSESSMENT & PLAN NOTE
PSA normal 1.3 back in July 2022.   Would like to wait to see urology.   Flomax daily.   Discussed if symptoms do not improve or worsen, to notify provider for referral to urology. .

## 2022-11-30 ENCOUNTER — OFFICE VISIT (OUTPATIENT)
Dept: INTERNAL MEDICINE | Facility: CLINIC | Age: 57
End: 2022-11-30

## 2022-11-30 VITALS
BODY MASS INDEX: 31.15 KG/M2 | TEMPERATURE: 98 F | HEIGHT: 72 IN | HEART RATE: 65 BPM | OXYGEN SATURATION: 99 % | SYSTOLIC BLOOD PRESSURE: 106 MMHG | DIASTOLIC BLOOD PRESSURE: 64 MMHG | WEIGHT: 230 LBS

## 2022-11-30 DIAGNOSIS — J06.9 VIRAL UPPER RESPIRATORY TRACT INFECTION: Primary | ICD-10-CM

## 2022-11-30 PROCEDURE — 99213 OFFICE O/P EST LOW 20 MIN: CPT | Performed by: NURSE PRACTITIONER

## 2022-11-30 RX ORDER — VORTIOXETINE 10 MG/1
10 TABLET, FILM COATED ORAL DAILY
COMMUNITY
Start: 2022-11-23

## 2022-11-30 RX ORDER — METHYLPREDNISOLONE 4 MG/1
TABLET ORAL
Qty: 21 EACH | Refills: 0 | Status: SHIPPED | OUTPATIENT
Start: 2022-11-30

## 2022-11-30 NOTE — ASSESSMENT & PLAN NOTE
Medrol pack as ordered.   Mucinex BID PRN.   Nasal saline PRN.   Continue with OTC symptom management.

## 2022-11-30 NOTE — PROGRESS NOTES
"Chief Complaint  Cough and URI    Subjective        Amadeo Leary presents to [facility]  History of Present Illness  This is a 56 y/o female presenting for complaints of productive cough. Patient reports he became sick about 5 weeks ago with sinus congestion and cold like symptoms. Reports the cough started over a week ago. Patient denies any fever. Patient reports he has been using tylenol PRN along with nasal spray. Denies n/v/d. Reports some mild malaise and fatigue. Patient reports taking covid 19 test last week which was negative.     Objective   Vital Signs:  /64 (BP Location: Left arm, Patient Position: Sitting, Cuff Size: Large Adult)   Pulse 65   Temp 98 °F (36.7 °C) (Infrared)   Ht 182.9 cm (72\")   Wt 104 kg (230 lb)   SpO2 99%   BMI 31.19 kg/m²   Estimated body mass index is 31.19 kg/m² as calculated from the following:    Height as of this encounter: 182.9 cm (72\").    Weight as of this encounter: 104 kg (230 lb).          Physical Exam  Constitutional:       Appearance: Normal appearance.   HENT:      Head: Normocephalic and atraumatic.      Right Ear: Ear canal and external ear normal.      Left Ear: Ear canal and external ear normal.      Nose: Congestion and rhinorrhea present.      Mouth/Throat:      Mouth: Mucous membranes are dry.      Pharynx: Oropharynx is clear.   Eyes:      Conjunctiva/sclera: Conjunctivae normal.      Pupils: Pupils are equal, round, and reactive to light.   Cardiovascular:      Rate and Rhythm: Normal rate and regular rhythm.   Musculoskeletal:      Cervical back: Normal range of motion and neck supple.   Neurological:      Mental Status: He is alert.        Result Review :  The following data was reviewed by: KEELEY Rivera on 11/30/2022:  Common labs    Common Labs 1/12/22 1/12/22 7/19/22 7/19/22 7/19/22 7/19/22 7/19/22    1017 1017 0951 0951 0951 0951 0951   Glucose  100 (A)  100 (A)      BUN  13  21      Creatinine  1.00 "  1.15      eGFR Non African Am  77        Sodium  141  139      Potassium  4.1  4.8      Chloride  104  101      Calcium  9.0  9.7      Total Protein    6.9      Albumin  3.90  4.5      Total Bilirubin  0.6  0.5      Alkaline Phosphatase  42  54      AST (SGOT)  23  28      ALT (SGPT)  35  39      WBC 4.65  5.4       Hemoglobin 14.8  16.5       Hematocrit 42.5  49.0       Platelets 194  214       Total Cholesterol      201 (A)    Triglycerides      56    HDL Cholesterol      46    LDL Cholesterol       145 (A)    Hemoglobin A1C     6.0 (A)     PSA       1.3   (A) Abnormal value       Comments are available for some flowsheets but are not being displayed.                     Assessment and Plan   Diagnoses and all orders for this visit:    1. Viral upper respiratory tract infection (Primary)  Assessment & Plan:  Medrol pack as ordered.   Mucinex BID PRN.   Nasal saline PRN.   Continue with OTC symptom management.       Orders:  -     methylPREDNISolone (MEDROL) 4 MG dose pack; Take as directed on package instructions.  Dispense: 21 each; Refill: 0           Follow Up   Return if symptoms worsen or fail to improve.  Patient was given instructions and counseling regarding his condition or for health maintenance advice. Please see specific information pulled into the AVS if appropriate.

## 2022-12-05 ENCOUNTER — PATIENT MESSAGE (OUTPATIENT)
Dept: INTERNAL MEDICINE | Facility: CLINIC | Age: 57
End: 2022-12-05

## 2022-12-05 RX ORDER — DOXYCYCLINE HYCLATE 100 MG/1
100 CAPSULE ORAL 2 TIMES DAILY
Qty: 20 CAPSULE | Refills: 0 | Status: SHIPPED | OUTPATIENT
Start: 2022-12-05 | End: 2022-12-15

## 2023-04-26 DIAGNOSIS — R39.198 DIFFICULTY IN URINATION: ICD-10-CM

## 2023-04-26 RX ORDER — TAMSULOSIN HYDROCHLORIDE 0.4 MG/1
1 CAPSULE ORAL DAILY
Qty: 90 CAPSULE | Refills: 2 | Status: SHIPPED | OUTPATIENT
Start: 2023-04-26

## 2023-05-30 ENCOUNTER — PREP FOR SURGERY (OUTPATIENT)
Dept: OTHER | Facility: HOSPITAL | Age: 58
End: 2023-05-30
Payer: COMMERCIAL

## 2023-05-30 DIAGNOSIS — Z83.71 FAMILY HISTORY OF COLONIC POLYPS: ICD-10-CM

## 2023-05-30 DIAGNOSIS — Z86.010 PERSONAL HISTORY OF COLONIC POLYPS: Primary | ICD-10-CM

## 2023-07-06 PROBLEM — Z83.71 FAMILY HISTORY OF COLONIC POLYPS: Status: ACTIVE | Noted: 2023-07-06

## 2023-07-06 PROBLEM — Z83.719 FAMILY HISTORY OF COLONIC POLYPS: Status: ACTIVE | Noted: 2023-07-06

## 2023-07-20 PROBLEM — R73.03 PREDIABETES: Status: ACTIVE | Noted: 2023-07-20

## 2023-07-20 PROBLEM — H65.191 ACUTE EFFUSION OF RIGHT EAR: Status: RESOLVED | Noted: 2022-02-11 | Resolved: 2023-07-20

## 2023-07-20 PROBLEM — E78.2 MIXED HYPERLIPIDEMIA: Status: ACTIVE | Noted: 2023-07-20

## 2023-07-20 PROBLEM — J06.9 VIRAL UPPER RESPIRATORY TRACT INFECTION: Status: RESOLVED | Noted: 2022-11-30 | Resolved: 2023-07-20

## 2023-08-18 ENCOUNTER — TELEPHONE (OUTPATIENT)
Dept: SURGERY | Facility: CLINIC | Age: 58
End: 2023-08-18
Payer: COMMERCIAL

## 2023-09-01 ENCOUNTER — TELEPHONE (OUTPATIENT)
Dept: SURGERY | Facility: CLINIC | Age: 58
End: 2023-09-01
Payer: COMMERCIAL

## 2023-09-06 ENCOUNTER — HOSPITAL ENCOUNTER (OUTPATIENT)
Facility: HOSPITAL | Age: 58
Setting detail: HOSPITAL OUTPATIENT SURGERY
Discharge: HOME | End: 2023-09-06
Attending: COLON & RECTAL SURGERY | Admitting: COLON & RECTAL SURGERY
Payer: COMMERCIAL

## 2023-09-06 ENCOUNTER — ANESTHESIA (OUTPATIENT)
Dept: GASTROENTEROLOGY | Facility: HOSPITAL | Age: 58
End: 2023-09-06
Payer: COMMERCIAL

## 2023-09-06 ENCOUNTER — ANESTHESIA EVENT (OUTPATIENT)
Dept: GASTROENTEROLOGY | Facility: HOSPITAL | Age: 58
End: 2023-09-06
Payer: COMMERCIAL

## 2023-09-06 VITALS
WEIGHT: 230.8 LBS | HEART RATE: 79 BPM | SYSTOLIC BLOOD PRESSURE: 105 MMHG | OXYGEN SATURATION: 97 % | BODY MASS INDEX: 31.26 KG/M2 | DIASTOLIC BLOOD PRESSURE: 79 MMHG | HEIGHT: 72 IN | RESPIRATION RATE: 16 BRPM

## 2023-09-06 PROCEDURE — 45378 DIAGNOSTIC COLONOSCOPY: CPT | Performed by: COLON & RECTAL SURGERY

## 2023-09-06 PROCEDURE — 25010000002 PROPOFOL 10 MG/ML EMULSION: Performed by: ANESTHESIOLOGY

## 2023-09-06 RX ORDER — SODIUM CHLORIDE, SODIUM LACTATE, POTASSIUM CHLORIDE, CALCIUM CHLORIDE 600; 310; 30; 20 MG/100ML; MG/100ML; MG/100ML; MG/100ML
30 INJECTION, SOLUTION INTRAVENOUS CONTINUOUS PRN
Status: DISCONTINUED | OUTPATIENT
Start: 2023-09-06 | End: 2023-09-06 | Stop reason: HOSPADM

## 2023-09-06 RX ORDER — PROPOFOL 10 MG/ML
VIAL (ML) INTRAVENOUS CONTINUOUS PRN
Status: DISCONTINUED | OUTPATIENT
Start: 2023-09-06 | End: 2023-09-06 | Stop reason: SURG

## 2023-09-06 RX ADMIN — SODIUM CHLORIDE, POTASSIUM CHLORIDE, SODIUM LACTATE AND CALCIUM CHLORIDE 30 ML/HR: 600; 310; 30; 20 INJECTION, SOLUTION INTRAVENOUS at 10:11

## 2023-09-06 RX ADMIN — PROPOFOL 50 MCG/KG/MIN: 10 INJECTION, EMULSION INTRAVENOUS at 10:26

## 2023-09-06 NOTE — DISCHARGE INSTRUCTIONS
For the next 24 hours patient needs to be with a responsible adult.    For 24 hours DO NOT drive, operate machinery, appliances, drink alcohol, make important decisions or sign legal documents.    Start with a light or bland diet if you are feeling sick to your stomach otherwise advance to regular diet as tolerated.    Follow recommendations on procedure report if provided by your doctor.    Call Dr Bergeron for problems [phone]    Problems may include but not limited to: large amounts of bleeding, trouble breathing, repeated vomiting, severe unrelieved pain, fever or chills.

## 2023-09-06 NOTE — ANESTHESIA POSTPROCEDURE EVALUATION
Patient: Amadeo Leary    Procedure Summary       Date: 09/06/23 Room / Location: [facility] ENDOSCOPY 9 / [facility] ENDOSCOPY    Anesthesia Start: 1022 Anesthesia Stop: 1044    Procedure: COLONOSCOPY TO CECUM Diagnosis:       Personal history of colonic polyps      Family history of colonic polyps      (Personal history of colonic polyps [Z86.010])      (Family history of colonic polyps [Z83.71])    Surgeons: Everett Bergeron MD Provider: Luigi Bates MD    Anesthesia Type: MAC ASA Status: 3            Anesthesia Type: MAC    Vitals  Vitals Value Taken Time   BP 94/52 09/06/23 1048   Temp     Pulse 54 09/06/23 1048   Resp 16 09/06/23 1048   SpO2 97 % 09/06/23 1048           Post Anesthesia Care and Evaluation    Patient location during evaluation: PACU  Patient participation: complete - patient participated  Level of consciousness: awake and alert  Pain management: adequate    Airway patency: patent  Anesthetic complications: No anesthetic complications    Cardiovascular status: acceptable  Respiratory status: acceptable  Hydration status: acceptable    Comments: --------------------            09/06/23               1048     --------------------   BP:       94/52      Pulse:      54       Resp:       16       SpO2:      97%      --------------------

## 2023-09-06 NOTE — H&P
Malcolm Leary is a 57 y.o. male  who is referred by Kelsey Bergeron MD for a colonoscopy. He   has an indications: previous adenomatous polyp.     He denies any change in bowel function, melena, or hematochezia.    Past Medical History:   Diagnosis Date    Abnormal cardiovascular stress test 03/16/2016    Abnormal EKG 03/2015    ADHD (attention deficit hyperactivity disorder) 1/1/2023    Allergic rhinitis     Anxiety     Colon polyps     FOLLOWED BY DR. KELSEY BERGERON    Depression     Eardrum rupture, left 11/2016    Elevated LDL cholesterol level 03/2015    Hemorrhoids     Rectal mass 01/2022    BENIGN POLYPOID MASS, FOLLOWED BY DR. KELSEY BERGERON    Seasonal allergies        Past Surgical History:   Procedure Laterality Date    CARDIAC CATHETERIZATION Left 03/12/2015    Normal coronary arteries, normal left ventricular systolic function.DR. CRISTINA HARMAN AT [facility]    COLONOSCOPY N/A 08/26/2016    1 TUBULAR ADENOMA POLYP IN TRANSVERSE, DR. CLAY MORALES AT [facility]    COLONOSCOPY N/A 01/12/2022    1 TUBULAR ADENOMA POLYP AT 80CM, 1 TUBULAR ADENOMA POLYP IN CECUM, 1 BENIGN POLYP IN RECTUM, 8 MM POLYPOID MASS IN RECTUM ABOVE HEMORRHOIDS, PATH:BENIGN, MILD INTERNAL HEMORRHOIDS, DR. MALCOLM CONNELL AT [facility]    COLONOSCOPY N/A 11/10/2006    DR. JEREMY PALMER AT [facility]    EAR TUBE REMOVAL Left 03/21/2017    DR. MARK SEVERTSON    EAR TUBE REMOVAL Left 06/06/2017    DR. MARK SEVERTSON    EAR TUBES Bilateral 2003    MYRINGOPLASTY Left 03/21/2017    WITH BILATERAL EAR TUBES, DR. MARK SEVERTSON    SHOULDER ARTHROSCOPY W/ LABRAL REPAIR Left 08/28/2020    WITH CHONDROPLASTY, ACROMIOPLASTY, AND EXCISION OF CALCIFIC DEPOSIT, DR. NALLELY LUGO    SIGMOIDOSCOPY N/A 02/24/2022    18 MM TUBULAR ADENOMA POLYP IN DISTAL RECTUM, RESCOPE IN 1 YR, DR. KELSEY BERGERON AT [facility]    SIGMOIDOSCOPY N/A 08/31/2022    5 MM TUBULAR ADENOMA POLYP IN DISTAL RECTUM, RESCOPE IN 1 YR, DR. KELSEY BERGERON AT [facility]       Medications Prior to Admission   Medication Sig Dispense  Refill Last Dose    Adderall XR 20 MG 24 hr capsule Take 1 capsule by mouth Every Morning   9/5/2023    colestipol (COLESTID) 1 g tablet Start with one pill daily and increase to 2 daily if no improvement after 2 weeks 60 tablet 11 9/5/2023    Desvenlafaxine Succinate ER 25 MG tablet sustained-release 24 hour Take 1 tablet by mouth Daily.   9/5/2023    fluticasone (FLONASE) 50 MCG/ACT nasal spray 2 sprays into the nostril(s) as directed by provider Daily.   Past Week    tamsulosin (FLOMAX) 0.4 MG capsule 24 hr capsule Take 1 capsule by mouth Daily. 90 capsule 2 9/5/2023    Cariprazine HCl (VRAYLAR) 1.5 MG capsule capsule Take 1 capsule by mouth Daily.          Allergies   Allergen Reactions    Levaquin [Levofloxacin In D5w] Itching       Family History   Problem Relation Age of Onset    Heart attack Father         Age 47   Heavy smoker     Heart disease Father     Colon polyps Mother     Hyperlipidemia Mother     Colon cancer Neg Hx     Prostate cancer Neg Hx     Malig Hyperthermia Neg Hx        Social History     Socioeconomic History    Marital status:    Tobacco Use    Smoking status: Never    Smokeless tobacco: Never   Vaping Use    Vaping Use: Never used   Substance and Sexual Activity    Alcohol use: Yes     Alcohol/week: 1.0 standard drink     Types: 1 Cans of beer per week     Comment: occasionally    Drug use: No    Sexual activity: Yes     Partners: Female     Comment:         Review of Systems   Gastrointestinal:  Negative for abdominal pain, nausea and vomiting.   All other systems reviewed and are negative.    Vitals:    09/06/23 1002   BP: 112/73   Pulse: 56   Resp: 15   SpO2: 98%         Physical Exam  Constitutional:       Appearance: He is well-developed.   HENT:      Head: Normocephalic and atraumatic.   Eyes:      Pupils: Pupils are equal, round, and reactive to light.   Cardiovascular:      Rate and Rhythm: Regular rhythm.   Pulmonary:      Effort: Pulmonary effort is normal.    Abdominal:      General: There is no distension.      Palpations: Abdomen is soft.   Musculoskeletal:         General: Normal range of motion.   Skin:     General: Skin is warm and dry.   Neurological:      Mental Status: He is alert and oriented to person, place, and time.   Psychiatric:         Thought Content: Thought content normal.         Judgment: Judgment normal.         Assessment & Plan      indications: previous adenomatous polyp         I recommend colonoscopy.  I described risks, benefits of the procedure with the patient including but not limited to bleeding, infection, possibility of perforation and possible polypectomy. All of the patient's questions were answered and they would like to proceed with the above recommendations.

## 2023-09-06 NOTE — ANESTHESIA PREPROCEDURE EVALUATION
Anesthesia Evaluation     Patient summary reviewed and Nursing notes reviewed                Airway   Mallampati: III  TM distance: >3 FB  Neck ROM: full  Dental      Pulmonary - negative pulmonary ROS   Cardiovascular     ECG reviewed  Rhythm: regular  Rate: normal    (+) hyperlipidemia      Neuro/Psych  (+) psychiatric history Anxiety, Depression and ADHD  GI/Hepatic/Renal/Endo    (+) morbid obesity    Musculoskeletal (-) negative ROS    Abdominal    Substance History - negative use     OB/GYN negative ob/gyn ROS         Other                      Anesthesia Plan    ASA 3     MAC     intravenous induction     Anesthetic plan, risks, benefits, and alternatives have been provided, discussed and informed consent has been obtained with: patient.    CODE STATUS:

## 2023-10-17 RX ORDER — MONTELUKAST SODIUM 4 MG/1
TABLET, CHEWABLE ORAL
Qty: 60 TABLET | Refills: 11 | Status: SHIPPED | OUTPATIENT
Start: 2023-10-17

## 2023-10-18 ENCOUNTER — OFFICE VISIT (OUTPATIENT)
Dept: SLEEP MEDICINE | Facility: HOSPITAL | Age: 58
End: 2023-10-18
Payer: COMMERCIAL

## 2023-10-18 VITALS
HEART RATE: 70 BPM | OXYGEN SATURATION: 96 % | SYSTOLIC BLOOD PRESSURE: 135 MMHG | DIASTOLIC BLOOD PRESSURE: 68 MMHG | HEIGHT: 72 IN | WEIGHT: 236.6 LBS | BODY MASS INDEX: 32.05 KG/M2

## 2023-10-18 DIAGNOSIS — R29.818 SUSPECTED SLEEP APNEA: Primary | ICD-10-CM

## 2023-10-18 DIAGNOSIS — R06.83 SNORING: ICD-10-CM

## 2023-10-18 DIAGNOSIS — E66.09 CLASS 1 OBESITY DUE TO EXCESS CALORIES WITH BODY MASS INDEX (BMI) OF 32.0 TO 32.9 IN ADULT, UNSPECIFIED WHETHER SERIOUS COMORBIDITY PRESENT: ICD-10-CM

## 2023-10-18 PROCEDURE — G0463 HOSPITAL OUTPT CLINIC VISIT: HCPCS

## 2023-10-18 NOTE — PROGRESS NOTES
[facility]  [address]  [place] [address]  Phone   Fax       Amadeo Leary  [phone]   1965  58 y.o.  male      Referring Provider and PCP: Magnolia Kilpatrick APRN    Type of service: Initial Sleep Medicine Consult.  Date of service: 10/18/2023          CHIEF COMPLAINT: Suspected sleep apnea      HISTORY OF PRESENT ILLNESS:  Thank you for asking us to see Amadeo Leary.  Amadeo Leary 58 y.o. was seen today on 10/18/2023 at [facility].  Patient presents today with symptoms of snoring, non-restorative sleep, and suspected sleep apnea.  The symptoms are chronic and persistent in nature.  Patient has no history of tonsillectomy, adenoidectomy, nasal surgery, UPPP.  He has family history of sleep apnea in sister and brother who are lower weight than him.  He has had frequent nighttime awakenings for years.  Sleep is nonrestorative even after getting 7.5 to 8 hours.      SLEEP HISTORY:  Sleep schedule:  Bedtime: 1030 to 11:30 PM  Wake time: 630 to 8 AM  Normally takes 5 minutes to fall asleep  Average hours of sleep: 7.5-8  Number of naps per day: 0    Symptoms:   In addition to the above, patient reports the following associated symptoms:  Have you ever awakened gasping for breath, coughing, choking: No   Change in weight:  No   Morning headaches:  No   Awaken with a sore throat or dry mouth:  Yes   Leg jerking at night:  No   Creepy crawly feeling in legs/urge to move legs: No   Teeth grinding: Yes   Have you ever awakened at night with a sour taste or burning sensation in your chest:  No   Do you have muscle weakness with laughing or anger:  No   Have you ever felt paralyzed while going to sleep or waking up:  No   Sleepwalking: No   Nightmares: No   Nocturia (urination at night): 0 times per night  Memory Problem: No     Medical Conditions (PMH):   ADHD  IBS  Anxiety and depression  BPH  Prediabetes    Social history:  Do you  "drive a commercial vehicle:  No   Shift work:  No   Tobacco use:  No   Alcohol use: 1 per week  Caffeinated drinks: 1 per day  Occupation: [employer], works with [employer]    Family History (parents and siblings) (pertaining to sleep medicine):  Sleep apnea    Medications: reviewed    Allergies:  Levaquin [levofloxacin in d5w]      REVIEW OF SYSTEMS:  Pertinent positive symptoms are:  Snoring  [place] Sleepiness Scale of Total score: 2   Fatigue  Depression        PHYSICAL EXAM:  CONSTITUTINONAL:   Vitals:    10/18/23 1100   BP: 135/68   Pulse: 70   SpO2: 96%   Weight: 107 kg (236 lb 9.6 oz)   Height: 182.9 cm (72\")    Body mass index is 32.09 kg/m².   HEAD: atraumatic, normocephalic   THROAT: tonsils are not enlarged, Mallampati class II-III  NECK: Neck Circumference: 18.5 inches, trachea is midline  RESPIRATORY SYSTEM: Respirations even, unlabored, normal rate  CARDIOVASULAR SYSTEM: Normal rate, no edema   NEUROLOGICAL SYSTEM: Alert and oriented x 3, normal gait  PSYCHIATRIC SYSTEM: Mood is normal/ appropriate     Office note(s) from care team reviewed. Office note(s) dated 7/20/2023, reviewed.    Labs/ Test Results Reviewed:  TSH          7/20/2023    08:52   TSH   TSH 2.420       Most Recent A1C          7/20/2023    08:52   HGBA1C Most Recent   Hemoglobin A1C 6.10               ASSESSMENT AND PLAN:   Suspected sleep apnea: patient's symptoms and physical examination are concerning for possible sleep apnea (G47.30).   I discussed the signs, symptoms, and pathophysiology of sleep apnea with this patient.  I also discussed the potential complications of untreated sleep apnea including but not limited to resistant hypertension, insulin resistance, pulmonary hypertension, atrial fibrillation, stroke, nonrestorative sleep with hypersomnia which can increase risk for motor vehicle accidents, etc.   Different testing methods including home-based and lab based sleep studies were discussed with this patient.   " Based on patient history and physical examination, will proceed with home sleep study, per patient preference.  The order for the sleep study is placed in [facility].  The test will be scheduled after prior authorization has been obtained through patient's insurance.  Treatment and management will be discussed in more detail with this patient after the test is completed.  All questions were answered to patient's satisfaction.   Snoring (R06.83): snoring is the sound created by turbulent airflow vibrating upper airway soft tissue.  I have also discussed factors affecting snoring including sleep deprivation, sleeping on the back and alcohol ingestion. To minimize snoring, patient is advised to have adequate sleep, sleep on their side, and avoid alcohol and sedative medications around bedtime.   Obesity: Body mass index is 32.09 kg/m².. Patients who are overweight or obese are at increased risk of sleep apnea/ sleep disordered breathing. Weight reduction and healthy lifestyle are encouraged in overweight/ obese patients as part of a comprehensive approach to sleep apnea treatment.    ADHD  Prediabetes  IBS-D  BPH  Hyperlipidemia  Anxiety and depression  Family history sleep apnea    Patient will follow-up after study, 31 to 90 days after PAP therapy initiated if applicable, or contact the office sooner for questions or concerns. Patient's questions were answered.            Thank you again for asking me to consult on this patient.  Please do not hesitate to call me if you have additional questions or concerns.       Tia Amin DNP, APRN  [facility]

## 2023-11-02 ENCOUNTER — HOSPITAL ENCOUNTER (OUTPATIENT)
Dept: SLEEP MEDICINE | Facility: HOSPITAL | Age: 58
End: 2023-11-02
Payer: COMMERCIAL

## 2023-11-02 DIAGNOSIS — R29.818 SUSPECTED SLEEP APNEA: ICD-10-CM

## 2023-11-02 DIAGNOSIS — R06.83 SNORING: ICD-10-CM

## 2023-11-02 DIAGNOSIS — E66.09 CLASS 1 OBESITY DUE TO EXCESS CALORIES WITH BODY MASS INDEX (BMI) OF 32.0 TO 32.9 IN ADULT, UNSPECIFIED WHETHER SERIOUS COMORBIDITY PRESENT: ICD-10-CM

## 2023-11-02 PROCEDURE — 95806 SLEEP STUDY UNATT&RESP EFFT: CPT

## 2023-11-14 ENCOUNTER — TELEPHONE (OUTPATIENT)
Dept: SLEEP MEDICINE | Facility: HOSPITAL | Age: 58
End: 2023-11-14
Payer: COMMERCIAL

## 2023-11-14 NOTE — TELEPHONE ENCOUNTER
LV for patient to call if he has questions about sleep study results , if he would like to schedule follow up to discuss .

## 2024-01-29 ENCOUNTER — OFFICE VISIT (OUTPATIENT)
Dept: INTERNAL MEDICINE | Facility: CLINIC | Age: 59
End: 2024-01-29
Payer: COMMERCIAL

## 2024-01-29 VITALS
HEIGHT: 72 IN | HEART RATE: 65 BPM | DIASTOLIC BLOOD PRESSURE: 82 MMHG | BODY MASS INDEX: 32.1 KG/M2 | SYSTOLIC BLOOD PRESSURE: 140 MMHG | WEIGHT: 237 LBS | OXYGEN SATURATION: 98 %

## 2024-01-29 DIAGNOSIS — H65.02 NON-RECURRENT ACUTE SEROUS OTITIS MEDIA OF LEFT EAR: Primary | ICD-10-CM

## 2024-01-29 PROCEDURE — 99213 OFFICE O/P EST LOW 20 MIN: CPT | Performed by: NURSE PRACTITIONER

## 2024-01-29 RX ORDER — AMOXICILLIN AND CLAVULANATE POTASSIUM 875; 125 MG/1; MG/1
1 TABLET, FILM COATED ORAL EVERY 12 HOURS SCHEDULED
Qty: 14 TABLET | Refills: 0 | Status: SHIPPED | OUTPATIENT
Start: 2024-01-29 | End: 2024-02-05

## 2024-01-29 NOTE — ASSESSMENT & PLAN NOTE
Augmentin BID x 7 days  Tylenol 650mg Q6H PRN for pain  Warm compresses PRN  RTO if symptoms do not improve or worsen  Avoid qtips

## 2024-01-29 NOTE — PROGRESS NOTES
"Chief Complaint  Left Ear Pain    Subjective        Amadeo Leary presents to [facility]  History of Present Illness  This is a 59 y/o male presenting to office for complaints of left ear pain, drainage. Reports symptoms started this previous weekend. Denies fever. Denies any systemic symptoms. Reports ongoing pain. Has not been taking OTC medications. Hx eardrum rupture without closure    Objective   Vital Signs:  /82 (BP Location: Left arm, Patient Position: Sitting, Cuff Size: Large Adult)   Pulse 65   Ht 182.9 cm (72\")   Wt 108 kg (237 lb)   SpO2 98%   BMI 32.14 kg/m²   Estimated body mass index is 32.14 kg/m² as calculated from the following:    Height as of this encounter: 182.9 cm (72\").    Weight as of this encounter: 108 kg (237 lb).               Physical Exam  Constitutional:       Appearance: Normal appearance. He is obese.   HENT:      Head: Normocephalic and atraumatic.      Left Ear: Ear canal and external ear normal. Tenderness present. Tympanic membrane is injected and erythematous.      Nose: Nose normal.      Mouth/Throat:      Mouth: Mucous membranes are moist.      Pharynx: Oropharynx is clear.   Eyes:      Conjunctiva/sclera: Conjunctivae normal.      Pupils: Pupils are equal, round, and reactive to light.   Cardiovascular:      Rate and Rhythm: Normal rate and regular rhythm.      Pulses: Normal pulses.      Heart sounds: Normal heart sounds. No murmur heard.     No gallop.   Pulmonary:      Effort: Pulmonary effort is normal. No respiratory distress.      Breath sounds: Normal breath sounds. No stridor. No wheezing, rhonchi or rales.   Musculoskeletal:      Cervical back: Normal range of motion and neck supple.   Skin:     General: Skin is warm and dry.   Neurological:      Mental Status: He is alert and oriented to person, place, and time. Mental status is at baseline.   Psychiatric:         Mood and Affect: Mood normal.         Thought Content: " Thought content normal.         Judgment: Judgment normal.        Result Review :    The following data was reviewed by: KEELEY Rivera on 01/29/2024:  Common labs          7/20/2023    08:52 8/11/2023    14:21   Common Labs   Glucose 117  99    BUN 21  22    Creatinine 1.30  1.19    Sodium 140  140    Potassium 5.1  4.5    Chloride 105  102    Calcium 9.9  9.9    Total Protein 6.6     Albumin 4.6     Total Bilirubin 0.5     Alkaline Phosphatase 57     AST (SGOT) 19     ALT (SGPT) 33     WBC 5.67     Hemoglobin 15.5     Hematocrit 46.0     Platelets 212     Total Cholesterol 197     Triglycerides 61     HDL Cholesterol 48     LDL Cholesterol  138     Hemoglobin A1C 6.10     PSA 1.280                    Assessment and Plan     Diagnoses and all orders for this visit:    1. Non-recurrent acute serous otitis media of left ear (Primary)  Assessment & Plan:  Augmentin BID x 7 days  Tylenol 650mg Q6H PRN for pain  Warm compresses PRN  RTO if symptoms do not improve or worsen  Avoid qtips    Orders:  -     amoxicillin-clavulanate (AUGMENTIN) 875-125 MG per tablet; Take 1 tablet by mouth Every 12 (Twelve) Hours for 7 days.  Dispense: 14 tablet; Refill: 0             Follow Up     Return if symptoms worsen or fail to improve.  Patient was given instructions and counseling regarding his condition or for health maintenance advice. Please see specific information pulled into the AVS if appropriate.

## 2024-02-16 ENCOUNTER — PATIENT MESSAGE (OUTPATIENT)
Dept: INTERNAL MEDICINE | Facility: CLINIC | Age: 59
End: 2024-02-16
Payer: COMMERCIAL

## 2024-02-16 DIAGNOSIS — H65.02 NON-RECURRENT ACUTE SEROUS OTITIS MEDIA OF LEFT EAR: Primary | ICD-10-CM

## 2024-02-19 RX ORDER — AMOXICILLIN AND CLAVULANATE POTASSIUM 875; 125 MG/1; MG/1
1 TABLET, FILM COATED ORAL 2 TIMES DAILY
Qty: 14 TABLET | Refills: 0 | Status: SHIPPED | OUTPATIENT
Start: 2024-02-19 | End: 2024-02-26

## 2024-02-19 NOTE — TELEPHONE ENCOUNTER
From: Amadeo Leary  To: Magnolia Jones  Sent: 2/16/2024 4:33 PM EST  Subject: Ear Infection    Magnolia,  The antibiotic that you prescribed took care of the initial infection, but recently during a shower, I inadvertently got water in my ear and it has become infected again. Fluid is draining from my left ear and I am not able to wear my hearing aid. Not sure of the next course of action: 1. Get another antibiotic (Amoxicillin) or 2. Make an appointment with ENT (Dr. Brown). He is very difficult to get an appointment with and I would prefer not to wait a week or two to get this resolved. I would prefer the antibiotic, but with this continuing to return you might see a difference.   Thoughts ??    Amadeo Leary

## 2024-04-08 DIAGNOSIS — R39.198 DIFFICULTY IN URINATION: ICD-10-CM

## 2024-04-09 RX ORDER — TAMSULOSIN HYDROCHLORIDE 0.4 MG/1
1 CAPSULE ORAL DAILY
Qty: 90 CAPSULE | Refills: 2 | Status: SHIPPED | OUTPATIENT
Start: 2024-04-09

## 2024-07-24 ENCOUNTER — OFFICE VISIT (OUTPATIENT)
Dept: INTERNAL MEDICINE | Facility: CLINIC | Age: 59
End: 2024-07-24
Payer: COMMERCIAL

## 2024-07-24 VITALS
BODY MASS INDEX: 31.42 KG/M2 | HEIGHT: 72 IN | HEART RATE: 74 BPM | WEIGHT: 232 LBS | SYSTOLIC BLOOD PRESSURE: 122 MMHG | DIASTOLIC BLOOD PRESSURE: 70 MMHG | OXYGEN SATURATION: 97 %

## 2024-07-24 DIAGNOSIS — H90.72 MIXED CONDUCTIVE AND SENSORINEURAL HEARING LOSS OF LEFT EAR WITH UNRESTRICTED HEARING OF RIGHT EAR: ICD-10-CM

## 2024-07-24 DIAGNOSIS — E66.09 CLASS 1 OBESITY DUE TO EXCESS CALORIES WITH SERIOUS COMORBIDITY AND BODY MASS INDEX (BMI) OF 31.0 TO 31.9 IN ADULT: ICD-10-CM

## 2024-07-24 DIAGNOSIS — F32.A ANXIETY AND DEPRESSION: ICD-10-CM

## 2024-07-24 DIAGNOSIS — Z00.00 ANNUAL PHYSICAL EXAM: Primary | ICD-10-CM

## 2024-07-24 DIAGNOSIS — E78.2 MIXED HYPERLIPIDEMIA: ICD-10-CM

## 2024-07-24 DIAGNOSIS — Z12.5 SCREENING PSA (PROSTATE SPECIFIC ANTIGEN): ICD-10-CM

## 2024-07-24 DIAGNOSIS — R39.198 DIFFICULTY IN URINATION: ICD-10-CM

## 2024-07-24 DIAGNOSIS — R73.03 PREDIABETES: ICD-10-CM

## 2024-07-24 DIAGNOSIS — F41.9 ANXIETY AND DEPRESSION: ICD-10-CM

## 2024-07-24 DIAGNOSIS — R06.09 DOE (DYSPNEA ON EXERTION): ICD-10-CM

## 2024-07-24 LAB
ALBUMIN SERPL-MCNC: 4.4 G/DL (ref 3.5–5.2)
ALBUMIN/GLOB SERPL: 2 G/DL
ALP SERPL-CCNC: 67 U/L (ref 39–117)
ALT SERPL-CCNC: 34 U/L (ref 1–41)
AST SERPL-CCNC: 21 U/L (ref 1–40)
BASOPHILS # BLD AUTO: 0.05 10*3/MM3 (ref 0–0.2)
BASOPHILS NFR BLD AUTO: 0.7 % (ref 0–1.5)
BILIRUB SERPL-MCNC: 0.2 MG/DL (ref 0–1.2)
BUN SERPL-MCNC: 21 MG/DL (ref 6–20)
BUN/CREAT SERPL: 17.5 (ref 7–25)
CALCIUM SERPL-MCNC: 9.8 MG/DL (ref 8.6–10.5)
CHLORIDE SERPL-SCNC: 105 MMOL/L (ref 98–107)
CHOLEST SERPL-MCNC: 225 MG/DL (ref 0–200)
CO2 SERPL-SCNC: 28.8 MMOL/L (ref 22–29)
CREAT SERPL-MCNC: 1.2 MG/DL (ref 0.76–1.27)
EGFRCR SERPLBLD CKD-EPI 2021: 69.7 ML/MIN/1.73
EOSINOPHIL # BLD AUTO: 0.2 10*3/MM3 (ref 0–0.4)
EOSINOPHIL NFR BLD AUTO: 3 % (ref 0.3–6.2)
ERYTHROCYTE [DISTWIDTH] IN BLOOD BY AUTOMATED COUNT: 12.7 % (ref 12.3–15.4)
GLOBULIN SER CALC-MCNC: 2.2 GM/DL
GLUCOSE SERPL-MCNC: 113 MG/DL (ref 65–99)
HBA1C MFR BLD: 6.1 % (ref 4.8–5.6)
HCT VFR BLD AUTO: 47.5 % (ref 37.5–51)
HDLC SERPL-MCNC: 41 MG/DL (ref 40–60)
HGB BLD-MCNC: 15.6 G/DL (ref 13–17.7)
IMM GRANULOCYTES # BLD AUTO: 0.06 10*3/MM3 (ref 0–0.05)
IMM GRANULOCYTES NFR BLD AUTO: 0.9 % (ref 0–0.5)
LDLC SERPL CALC-MCNC: 152 MG/DL (ref 0–100)
LYMPHOCYTES # BLD AUTO: 2.11 10*3/MM3 (ref 0.7–3.1)
LYMPHOCYTES NFR BLD AUTO: 31.3 % (ref 19.6–45.3)
MCH RBC QN AUTO: 30.2 PG (ref 26.6–33)
MCHC RBC AUTO-ENTMCNC: 32.8 G/DL (ref 31.5–35.7)
MCV RBC AUTO: 91.9 FL (ref 79–97)
MONOCYTES # BLD AUTO: 0.64 10*3/MM3 (ref 0.1–0.9)
MONOCYTES NFR BLD AUTO: 9.5 % (ref 5–12)
NEUTROPHILS # BLD AUTO: 3.69 10*3/MM3 (ref 1.7–7)
NEUTROPHILS NFR BLD AUTO: 54.6 % (ref 42.7–76)
NRBC BLD AUTO-RTO: 0 /100 WBC (ref 0–0.2)
PLATELET # BLD AUTO: 219 10*3/MM3 (ref 140–450)
POTASSIUM SERPL-SCNC: 5.6 MMOL/L (ref 3.5–5.2)
PROT SERPL-MCNC: 6.6 G/DL (ref 6–8.5)
PSA SERPL-MCNC: 1.76 NG/ML (ref 0–4)
RBC # BLD AUTO: 5.17 10*6/MM3 (ref 4.14–5.8)
SODIUM SERPL-SCNC: 144 MMOL/L (ref 136–145)
TRIGL SERPL-MCNC: 178 MG/DL (ref 0–150)
TSH SERPL DL<=0.005 MIU/L-ACNC: 2.2 UIU/ML (ref 0.27–4.2)
VLDLC SERPL CALC-MCNC: 32 MG/DL (ref 5–40)
WBC # BLD AUTO: 6.75 10*3/MM3 (ref 3.4–10.8)

## 2024-07-24 PROCEDURE — 99214 OFFICE O/P EST MOD 30 MIN: CPT | Performed by: NURSE PRACTITIONER

## 2024-07-24 PROCEDURE — 93000 ELECTROCARDIOGRAM COMPLETE: CPT | Performed by: NURSE PRACTITIONER

## 2024-07-24 PROCEDURE — 99396 PREV VISIT EST AGE 40-64: CPT | Performed by: NURSE PRACTITIONER

## 2024-07-24 NOTE — ASSESSMENT & PLAN NOTE
The 10-year ASCVD risk score (Amanda LEON, et al., 2019) is: 7.4%    Values used to calculate the score:      Age: 58 years      Sex: Male      Is Non- : No      Diabetic: No      Tobacco smoker: No      Systolic Blood Pressure: 122 mmHg      Is BP treated: No      HDL Cholesterol: 48 mg/dL      Total Cholesterol: 197 mg/dL    Lipid panel today  We discussed due to his paternal hx of CAD/MI; he may benefit from starting statin therapy to reduce risk; will check lab today and f/u

## 2024-07-24 NOTE — ASSESSMENT & PLAN NOTE
Recommended 150-300 min weekly exercise  Continue with healthy diet choices  Labs ordered  PSA today  Anticipatory guidance given regarding health prevention/wellness, diet/exercise, tobacco/alcohol/drug education, exercise and wellbeing, covid 19 guidance, vaccination recommendations, and sexual health/STD education.   Recommended bi-yearly dental exams and regular vision examinations.

## 2024-07-24 NOTE — PROGRESS NOTES
"Chief Complaint  Annual Exam    Subjective        Amadeo Leary presents to [facility]ARE  History of Present Illness  This is a 59 y/o male presenting to office for CPE.     Reports occasional exercise; struggles with diet choices.     Colonoscopy UTD 2023; on 10 year recall   Continues on colestid for IBS-D    Continues following with behavioral health-- continues on vraylar, desvenlafaxine; denies SI. Reports mood overall stable. His 2 children are headed off to college together this fall. Continues on adderall for ADHD.     Continues on flomax for dribbling, urinary flow issues; reports doing okay but sometimes still noticing slowing of flow. Not ready to see urology yet at this time. PSA today.    Reports he has noticed more GILL recently; reports he has noticed with his regular activities he has been having more fatigue and GILL. Reports this has been ongoing x 1 year. Reports he is not wheezing. Reports he has just noticed his conditioning is not the same. Reports his activity levels have changed within the past year and a half. Reports he has noticed being more winded recently. Denies any CP.     Working on scheduling ear surgery for left ear due to chronic issues. Following with Dr. Severtson with ENT.     Objective   Vital Signs:  /70 (BP Location: Left arm, Patient Position: Sitting, Cuff Size: Adult)   Pulse 74   Ht 182.9 cm (72\")   Wt 105 kg (232 lb)   SpO2 97%   BMI 31.46 kg/m²   Estimated body mass index is 31.46 kg/m² as calculated from the following:    Height as of this encounter: 182.9 cm (72\").    Weight as of this encounter: 105 kg (232 lb).       BMI is >= 30 and <35. (Class 1 Obesity). The following options were offered after discussion;: exercise counseling/recommendations and nutrition counseling/recommendations      Physical Exam  Constitutional:       Appearance: Normal appearance. He is obese.   HENT:      Head: Normocephalic and atraumatic.      " Right Ear: Ear canal and external ear normal.      Left Ear: Ear canal and external ear normal.      Nose: Nose normal.      Mouth/Throat:      Mouth: Mucous membranes are moist.      Pharynx: Oropharynx is clear.   Eyes:      Conjunctiva/sclera: Conjunctivae normal.      Pupils: Pupils are equal, round, and reactive to light.   Neck:      Vascular: No carotid bruit.   Cardiovascular:      Rate and Rhythm: Normal rate and regular rhythm.      Pulses: Normal pulses.      Heart sounds: Normal heart sounds. No murmur heard.     No friction rub. No gallop.   Pulmonary:      Effort: Pulmonary effort is normal. No respiratory distress.      Breath sounds: Normal breath sounds. No stridor. No wheezing, rhonchi or rales.   Abdominal:      General: Bowel sounds are normal. There is no distension.      Palpations: There is no mass.      Tenderness: There is no abdominal tenderness. There is no guarding.      Hernia: No hernia is present.   Musculoskeletal:         General: No swelling.      Cervical back: Normal range of motion and neck supple.   Skin:     General: Skin is warm and dry.      Capillary Refill: Capillary refill takes less than 2 seconds.   Neurological:      Mental Status: He is alert and oriented to person, place, and time. Mental status is at baseline.   Psychiatric:         Mood and Affect: Mood normal.         Thought Content: Thought content normal.         Judgment: Judgment normal.        Result Review :    The following data was reviewed by: KEELEY Rivera on 07/24/2024:  Common labs          8/11/2023    14:21   Common Labs   Glucose 99    BUN 22    Creatinine 1.19    Sodium 140    Potassium 4.5    Chloride 102    Calcium 9.9      Tobacco Use: Low Risk  (7/24/2024)    Patient History     Smoking Tobacco Use: Never     Smokeless Tobacco Use: Never     Passive Exposure: Not on file     Social History     Substance and Sexual Activity   Alcohol Use Yes    Alcohol/week: 1.0 standard drink of alcohol     Types: 1 Cans of beer per week    Comment: occasionally     Family History   Problem Relation Age of Onset    Heart attack Father         Age 47   Heavy smoker     Heart disease Father     Colon polyps Mother     Hyperlipidemia Mother     Colon cancer Neg Hx     Prostate cancer Neg Hx     Malig Hyperthermia Neg Hx      The 10-year ASCVD risk score (Amanda LEON, et al., 2019) is: 7.4%    Values used to calculate the score:      Age: 58 years      Sex: Male      Is Non- : No      Diabetic: No      Tobacco smoker: No      Systolic Blood Pressure: 122 mmHg      Is BP treated: No      HDL Cholesterol: 48 mg/dL      Total Cholesterol: 197 mg/dL     Adult ECG Report     Name: Amadeo Leary   Age: 58 y.o.   Gender: male       Rate: 57   Rhythm: bradycardia   QRS Axis: 0   IN Interval: 172   QRS Duration: 104   QTc: 393   Voltages: -   Conduction Disturbances: none   Other Abnormalities: none     Narrative Interpretation: sinus bradycardia  Stable compared to 2016 EKG study         Assessment and Plan     Diagnoses and all orders for this visit:    1. Annual physical exam (Primary)  Assessment & Plan:  Recommended 150-300 min weekly exercise  Continue with healthy diet choices  Labs ordered  PSA today  Anticipatory guidance given regarding health prevention/wellness, diet/exercise, tobacco/alcohol/drug education, exercise and wellbeing, covid 19 guidance, vaccination recommendations, and sexual health/STD education.   Recommended bi-yearly dental exams and regular vision examinations.       Orders:  -     CBC & Differential  -     Comprehensive metabolic panel  -     TSH Rfx On Abnormal To Free T4  -     Hemoglobin A1c    2. Anxiety and depression  Assessment & Plan:  Stable on current regimen  Denies SI  Following with behavioral health      3. Prediabetes  Assessment & Plan:  Lab today  Recommended low glycemic diet choices      4. Mixed conductive and sensorineural hearing loss of left ear with  unrestricted hearing of right ear  Assessment & Plan:  Has hearing aid.          5. GILL (dyspnea on exertion)  Assessment & Plan:  EKG in office  CT of chest  Referral placed to cardiology  Labs today     Orders:  -     CT Chest Without Contrast; Future  -     Ambulatory Referral to Cardiology  -     ECG 12 Lead    6. Mixed hyperlipidemia  Assessment & Plan:  The 10-year ASCVD risk score (Amanda LEON, et al., 2019) is: 7.4%    Values used to calculate the score:      Age: 58 years      Sex: Male      Is Non- : No      Diabetic: No      Tobacco smoker: No      Systolic Blood Pressure: 122 mmHg      Is BP treated: No      HDL Cholesterol: 48 mg/dL      Total Cholesterol: 197 mg/dL    Lipid panel today  We discussed due to his paternal hx of CAD/MI; he may benefit from starting statin therapy to reduce risk; will check lab today and f/u    Orders:  -     Lipid panel    7. Screening PSA (prostate specific antigen)  -     PSA SCREENING    8. Class 1 obesity due to excess calories with serious comorbidity and body mass index (BMI) of 31.0 to 31.9 in adult  Assessment & Plan:  BMI is >= 30 and <35. (Class 1 Obesity). The following options were offered after discussion;: exercise counseling/recommendations and nutrition counseling/recommendations      9. Difficulty in urination  Assessment & Plan:  Continues on flomax   Not wanting to see urology at this time  PSA screening due today               Follow Up     Return in about 1 year (around 7/24/2025) for Annual physical.  Patient was given instructions and counseling regarding his condition or for health maintenance advice. Please see specific information pulled into the AVS if appropriate.

## 2024-07-25 DIAGNOSIS — E87.5 HYPERKALEMIA: Primary | ICD-10-CM

## 2024-08-20 ENCOUNTER — OFFICE VISIT (OUTPATIENT)
Age: 59
End: 2024-08-20
Payer: COMMERCIAL

## 2024-08-20 VITALS
HEIGHT: 72 IN | WEIGHT: 231 LBS | OXYGEN SATURATION: 99 % | BODY MASS INDEX: 31.29 KG/M2 | DIASTOLIC BLOOD PRESSURE: 74 MMHG | SYSTOLIC BLOOD PRESSURE: 118 MMHG | HEART RATE: 70 BPM

## 2024-08-20 DIAGNOSIS — E78.2 MIXED HYPERLIPIDEMIA: ICD-10-CM

## 2024-08-20 DIAGNOSIS — Z82.49 FAMILY HISTORY OF EARLY CAD: ICD-10-CM

## 2024-08-20 DIAGNOSIS — R06.09 DOE (DYSPNEA ON EXERTION): Primary | ICD-10-CM

## 2024-08-20 PROCEDURE — 99204 OFFICE O/P NEW MOD 45 MIN: CPT | Performed by: STUDENT IN AN ORGANIZED HEALTH CARE EDUCATION/TRAINING PROGRAM

## 2024-08-20 NOTE — PROGRESS NOTES
"    Subjective:     Encounter Date:08/20/2024      Patient ID: Amadeo Leary is a 58 y.o. male.    Chief Complaint:  Dyspnea on exertion    HPI:   58 y.o. male prediabetes, hyperlipidemia, FH of early CAD (father had a fatal MI at age 47) who presents for initial evaluation of dyspnea on exertion.  Patient notes that over the past 3 months he has noticed significant dyspnea on exertion which is new for him.  He states that just a year ago, he was exercising very frequently with his son, weightlifting, and performing cardio.  He was doing so without any limitations.  More recently, he gets winded with almost all activities.  He states that mowing his lawn has become burdensome and he has to take a break in order to finish doing so.  He also has been trying to exercise but intermediate through he feels like he is \"huffing and puffing\" as well as having trouble breathing.  Of note, he had a nuclear stress test in 2015 which revealed subtle ST depressions inferiorly as well as a possible small reversible area of ischemia at the base of the inferior wall that was likely due to diaphragmatic attenuation artifact.  Regardless he had a subsequent coronary angiogram and this was normal.    Father- fatal MI at age 47        The following portions of the patient's history were reviewed and updated as appropriate: allergies, current medications, past family history, past medical history, past social history, past surgical history and problem list.     REVIEW OF SYSTEMS:   All systems reviewed.  Pertinent positives identified in HPI.  All other systems are negative.    Past Medical History:   Diagnosis Date    Abnormal cardiovascular stress test 03/16/2016    Abnormal EKG 03/2015    ADHD (attention deficit hyperactivity disorder) 1/1/2023    Allergic rhinitis     Anxiety     Colon polyps     FOLLOWED BY DR. KELSEY BLANCO    Depression     Eardrum rupture, left 11/2016    Elevated LDL cholesterol level 03/2015    Hemorrhoids     " Rectal mass 01/2022    BENIGN POLYPOID MASS, FOLLOWED BY DR. KELSEY BLANCO    Seasonal allergies        Family History   Problem Relation Age of Onset    Heart attack Father         Age 47   Heavy smoker     Heart disease Father     Colon polyps Mother     Hyperlipidemia Mother     Colon cancer Neg Hx     Prostate cancer Neg Hx     Malig Hyperthermia Neg Hx        Social History     Socioeconomic History    Marital status:    Tobacco Use    Smoking status: Never    Smokeless tobacco: Never   Vaping Use    Vaping status: Never Used   Substance and Sexual Activity    Alcohol use: Yes     Alcohol/week: 1.0 standard drink of alcohol     Types: 1 Cans of beer per week     Comment: occasionally    Drug use: No    Sexual activity: Yes     Partners: Female     Comment:         Allergies   Allergen Reactions    Levaquin [Levofloxacin In D5w] Itching       Past Surgical History:   Procedure Laterality Date    CARDIAC CATHETERIZATION Left 03/12/2015    Normal coronary arteries, normal left ventricular systolic function.DR. CRISTINA HARMAN AT [facility]    COLONOSCOPY N/A 08/26/2016    1 TUBULAR ADENOMA POLYP IN TRANSVERSE, DR. CLAY MORALES AT [facility]    COLONOSCOPY N/A 01/12/2022    1 TUBULAR ADENOMA POLYP AT 80CM, 1 TUBULAR ADENOMA POLYP IN CECUM, 1 BENIGN POLYP IN RECTUM, 8 MM POLYPOID MASS IN RECTUM ABOVE HEMORRHOIDS, PATH:BENIGN, MILD INTERNAL HEMORRHOIDS, DR. MALCOLM CONNELL AT [facility]    COLONOSCOPY N/A 11/10/2006    DR. JEREMY PALMER AT [place]    COLONOSCOPY N/A 09/06/2023    ENTIRE COLON WNL, RESCOPE IN 5 YRS, DR. KELSEY BLANCO AT [facility]    EAR TUBE REMOVAL Left 03/21/2017    DR. MARK SEVERTSON    EAR TUBE REMOVAL Left 06/06/2017    DR. MARK SEVERTSON    EAR TUBES Bilateral 2003    MYRINGOPLASTY Left 03/21/2017    WITH BILATERAL EAR TUBES, DR. MARK SEVERTSON    SHOULDER ARTHROSCOPY W/ LABRAL REPAIR Left 08/28/2020    WITH CHONDROPLASTY, ACROMIOPLASTY, AND EXCISION OF CALCIFIC DEPOSIT, DR. NALLELY LUGO    SIGMOIDOSCOPY N/A  02/24/2022    18 MM TUBULAR ADENOMA POLYP IN DISTAL RECTUM, RESCOPE IN 1 YR, DR. KELSEY BLANCO AT [facility]    SIGMOIDOSCOPY N/A 08/31/2022    5 MM TUBULAR ADENOMA POLYP IN DISTAL RECTUM, RESCOPE IN 1 YR, DR. KELSEY BLANCO AT [facility]       Procedures       Objective:         Vitals:    08/20/24 1315   BP: 118/74   Pulse: 70   SpO2: 99%       PHYSICAL EXAM:  GEN: well appearing, in NAD   HEENT: NCAT, EOMI, moist mucus membranes   Respiratory: CTAB, no wheezes, rales or rhonchi  CV: normal rate, regular rhythm, normal S1, S2, no murmurs, rubs, gallops, +2 radial pulses b/l  GI: soft, nontender, nondistended  MSK: no edema  Skin: no rash, warm, dry  Heme/Lymph: no bruising or bleeding  Neuro: Alert and Oriented x 3, grossly normal motor function        Assessment:         (R06.09) GILL (dyspnea on exertion) - Plan: Stress Test With Myocardial Perfusion One Day    (E78.2) Mixed hyperlipidemia    (Z82.49) Family history of early CAD    58 y.o. male prediabetes, hyperlipidemia, FH of early CAD (father had a fatal MI at age 47) who presents for initial evaluation of dyspnea on exertion.  Patient notes that over the past 3 months he has noticed significant dyspnea on exertion which is new for him.       Plan:       #GILL  Symptoms concerning for obstructive CAD.   -Exercise nuclear stress test, we discussed proceeding with medical therapy and possible coronary angiography pending results and patient is agreeable.    KEELEY Rivera, thank you very much for referring this kind patient to me. Please call me with any questions or concerns. I will see the patient again in the office pending test results         Meir Roy MD, FAC, [facility]  08/20/24  [facility]    Outpatient Encounter Medications as of 8/20/2024   Medication Sig Dispense Refill    Adderall XR 20 MG 24 hr capsule Take 1 capsule by mouth Every Morning      Cariprazine HCl (VRAYLAR) 1.5 MG capsule capsule Take 1 capsule by mouth Daily.      colestipol  (COLESTID) 1 g tablet Start with one pill daily and increase to 2 daily if no improvement after 2 weeks 60 tablet 11    Desvenlafaxine Succinate ER 25 MG tablet sustained-release 24 hour Take 1 tablet by mouth Daily.      tamsulosin (FLOMAX) 0.4 MG capsule 24 hr capsule Take 1 capsule by mouth Daily. 90 capsule 2     No facility-administered encounter medications on file as of 8/20/2024.

## 2024-08-26 ENCOUNTER — TELEPHONE (OUTPATIENT)
Dept: CARDIOLOGY | Facility: CLINIC | Age: 59
End: 2024-08-26
Payer: COMMERCIAL

## 2024-08-27 ENCOUNTER — HOSPITAL ENCOUNTER (OUTPATIENT)
Dept: CARDIOLOGY | Facility: HOSPITAL | Age: 59
Discharge: HOME | End: 2024-08-27
Payer: COMMERCIAL

## 2024-08-27 VITALS — HEIGHT: 72 IN | WEIGHT: 231.48 LBS | BODY MASS INDEX: 31.35 KG/M2

## 2024-08-27 DIAGNOSIS — R06.09 DOE (DYSPNEA ON EXERTION): ICD-10-CM

## 2024-08-27 LAB
BH CV NUCLEAR PRIOR STUDY: 2
BH CV REST NUCLEAR ISOTOPE DOSE: 10.6 MCI
BH CV STRESS BP STAGE 1: NORMAL
BH CV STRESS BP STAGE 2: NORMAL
BH CV STRESS BP STAGE 3: NORMAL
BH CV STRESS DURATION MIN STAGE 1: 3
BH CV STRESS DURATION MIN STAGE 2: 3
BH CV STRESS DURATION MIN STAGE 3: 3
BH CV STRESS DURATION SEC STAGE 1: 0
BH CV STRESS DURATION SEC STAGE 2: 0
BH CV STRESS DURATION SEC STAGE 3: 0
BH CV STRESS GRADE STAGE 1: 10
BH CV STRESS GRADE STAGE 2: 12
BH CV STRESS GRADE STAGE 3: 14
BH CV STRESS HR STAGE 1: 110
BH CV STRESS HR STAGE 2: 130
BH CV STRESS HR STAGE 3: 141
BH CV STRESS METS STAGE 1: 5
BH CV STRESS METS STAGE 2: 7.5
BH CV STRESS METS STAGE 3: 10
BH CV STRESS NUCLEAR ISOTOPE DOSE: 34.2 MCI
BH CV STRESS PROTOCOL 1: NORMAL
BH CV STRESS RECOVERY BP: NORMAL MMHG
BH CV STRESS RECOVERY HR: 96 BPM
BH CV STRESS SPEED STAGE 1: 1.7
BH CV STRESS SPEED STAGE 2: 2.5
BH CV STRESS SPEED STAGE 3: 3.4
BH CV STRESS STAGE 1: 1
BH CV STRESS STAGE 2: 2
BH CV STRESS STAGE 3: 3
LV EF NUC BP: 62 %
MAXIMAL PREDICTED HEART RATE: 161 BPM
PERCENT MAX PREDICTED HR: 87.58 %
STRESS BASELINE BP: NORMAL MMHG
STRESS BASELINE HR: 68 BPM
STRESS PERCENT HR: 103 %
STRESS POST ESTIMATED WORKLOAD: 10 METS
STRESS POST EXERCISE DUR MIN: 9 MIN
STRESS POST EXERCISE DUR SEC: 0 SEC
STRESS POST PEAK BP: NORMAL MMHG
STRESS POST PEAK HR: 141 BPM
STRESS TARGET HR: 137 BPM

## 2024-08-27 PROCEDURE — A9502 TC99M TETROFOSMIN: HCPCS | Performed by: STUDENT IN AN ORGANIZED HEALTH CARE EDUCATION/TRAINING PROGRAM

## 2024-08-27 PROCEDURE — 93017 CV STRESS TEST TRACING ONLY: CPT

## 2024-08-27 PROCEDURE — 0 TECHNETIUM TETROFOSMIN KIT: Performed by: STUDENT IN AN ORGANIZED HEALTH CARE EDUCATION/TRAINING PROGRAM

## 2024-08-27 PROCEDURE — 78452 HT MUSCLE IMAGE SPECT MULT: CPT

## 2024-08-27 RX ADMIN — TETROFOSMIN 1 DOSE: 1.38 INJECTION, POWDER, LYOPHILIZED, FOR SOLUTION INTRAVENOUS at 11:59

## 2024-08-27 RX ADMIN — TETROFOSMIN 1 DOSE: 1.38 INJECTION, POWDER, LYOPHILIZED, FOR SOLUTION INTRAVENOUS at 12:54

## 2024-11-20 RX ORDER — MONTELUKAST SODIUM 4 MG/1
TABLET, CHEWABLE ORAL
Qty: 60 TABLET | Refills: 11 | Status: SHIPPED | OUTPATIENT
Start: 2024-11-20

## 2024-11-20 NOTE — TELEPHONE ENCOUNTER
Medication Requested Colestipol 1gm    Last Refill 10/17/2023    Last OV 9/1/2022    Next OV None    Medication pended for approval and correct pharmacy verified Yes

## 2025-01-09 ENCOUNTER — PATIENT MESSAGE (OUTPATIENT)
Dept: INTERNAL MEDICINE | Facility: CLINIC | Age: 60
End: 2025-01-09
Payer: COMMERCIAL

## 2025-01-09 DIAGNOSIS — R39.198 DIFFICULTY IN URINATION: ICD-10-CM

## 2025-01-09 RX ORDER — TAMSULOSIN HYDROCHLORIDE 0.4 MG/1
1 CAPSULE ORAL DAILY
Qty: 90 CAPSULE | Refills: 2 | Status: SHIPPED | OUTPATIENT
Start: 2025-01-09

## 2025-07-25 ENCOUNTER — HOSPITAL ENCOUNTER (EMERGENCY)
Dept: CARDIOLOGY | Facility: HOSPITAL | Age: 60
Discharge: HOME | End: 2025-07-25
Payer: COMMERCIAL

## 2025-07-25 ENCOUNTER — HOSPITAL ENCOUNTER (EMERGENCY)
Facility: HOSPITAL | Age: 60
Discharge: OTHER INSTITUTION | End: 2025-07-25
Attending: EMERGENCY MEDICINE
Payer: COMMERCIAL

## 2025-07-25 ENCOUNTER — OFFICE VISIT (OUTPATIENT)
Dept: INTERNAL MEDICINE | Facility: CLINIC | Age: 60
End: 2025-07-25
Payer: COMMERCIAL

## 2025-07-25 ENCOUNTER — APPOINTMENT (OUTPATIENT)
Dept: GENERAL RADIOLOGY | Facility: HOSPITAL | Age: 60
End: 2025-07-25
Payer: COMMERCIAL

## 2025-07-25 VITALS
RESPIRATION RATE: 22 BRPM | SYSTOLIC BLOOD PRESSURE: 116 MMHG | OXYGEN SATURATION: 98 % | BODY MASS INDEX: 30.2 KG/M2 | HEIGHT: 72 IN | TEMPERATURE: 97.6 F | HEART RATE: 129 BPM | DIASTOLIC BLOOD PRESSURE: 93 MMHG | WEIGHT: 223 LBS

## 2025-07-25 VITALS
HEART RATE: 110 BPM | SYSTOLIC BLOOD PRESSURE: 106 MMHG | DIASTOLIC BLOOD PRESSURE: 76 MMHG | RESPIRATION RATE: 16 BRPM | OXYGEN SATURATION: 99 %

## 2025-07-25 VITALS
OXYGEN SATURATION: 96 % | SYSTOLIC BLOOD PRESSURE: 125 MMHG | BODY MASS INDEX: 30.2 KG/M2 | HEIGHT: 72 IN | DIASTOLIC BLOOD PRESSURE: 70 MMHG | HEART RATE: 60 BPM | WEIGHT: 223 LBS

## 2025-07-25 DIAGNOSIS — F41.9 ANXIETY AND DEPRESSION: Chronic | ICD-10-CM

## 2025-07-25 DIAGNOSIS — I48.91 ATRIAL FIBRILLATION WITH RAPID VENTRICULAR RESPONSE: Primary | ICD-10-CM

## 2025-07-25 DIAGNOSIS — K58.0 IRRITABLE BOWEL SYNDROME WITH DIARRHEA: Chronic | ICD-10-CM

## 2025-07-25 DIAGNOSIS — Z00.00 ANNUAL PHYSICAL EXAM: Primary | ICD-10-CM

## 2025-07-25 DIAGNOSIS — I49.9 IRREGULAR HEART RATE: ICD-10-CM

## 2025-07-25 DIAGNOSIS — F90.9 ATTENTION DEFICIT HYPERACTIVITY DISORDER (ADHD), UNSPECIFIED ADHD TYPE: ICD-10-CM

## 2025-07-25 DIAGNOSIS — Z78.9 MEASLES, MUMPS, RUBELLA (MMR) VACCINATION STATUS UNKNOWN: ICD-10-CM

## 2025-07-25 DIAGNOSIS — F32.A ANXIETY AND DEPRESSION: Chronic | ICD-10-CM

## 2025-07-25 DIAGNOSIS — N40.1 BENIGN PROSTATIC HYPERPLASIA WITH URINARY HESITANCY: Chronic | ICD-10-CM

## 2025-07-25 DIAGNOSIS — R73.03 PREDIABETES: Chronic | ICD-10-CM

## 2025-07-25 DIAGNOSIS — R39.11 BENIGN PROSTATIC HYPERPLASIA WITH URINARY HESITANCY: Chronic | ICD-10-CM

## 2025-07-25 DIAGNOSIS — I48.91 ATRIAL FIBRILLATION WITH RAPID VENTRICULAR RESPONSE: ICD-10-CM

## 2025-07-25 DIAGNOSIS — Z12.5 SCREENING PSA (PROSTATE SPECIFIC ANTIGEN): ICD-10-CM

## 2025-07-25 DIAGNOSIS — E78.2 MIXED HYPERLIPIDEMIA: Chronic | ICD-10-CM

## 2025-07-25 PROBLEM — H65.02 NON-RECURRENT ACUTE SEROUS OTITIS MEDIA OF LEFT EAR: Status: RESOLVED | Noted: 2024-01-29 | Resolved: 2025-07-25

## 2025-07-25 PROBLEM — R06.09 DOE (DYSPNEA ON EXERTION): Status: RESOLVED | Noted: 2024-07-24 | Resolved: 2025-07-25

## 2025-07-25 LAB
ALBUMIN SERPL-MCNC: 4.2 G/DL (ref 3.5–5.2)
ALBUMIN/GLOB SERPL: 1.6 G/DL
ALP SERPL-CCNC: 58 U/L (ref 39–117)
ALT SERPL W P-5'-P-CCNC: 56 U/L (ref 1–41)
ANION GAP SERPL CALCULATED.3IONS-SCNC: 8.9 MMOL/L (ref 5–15)
AORTIC ARCH: 3 CM
AORTIC DIMENSIONLESS INDEX: 0.75 (DI)
ASCENDING AORTA: 2.9 CM
AST SERPL-CCNC: 39 U/L (ref 1–40)
AV MEAN PRESS GRAD SYS DOP V1V2: 2 MMHG
AV VMAX SYS DOP: 85.3 CM/SEC
BASOPHILS # BLD AUTO: 0.03 10*3/MM3 (ref 0–0.2)
BASOPHILS NFR BLD AUTO: 0.5 % (ref 0–1.5)
BH CV ECHO LEFT VENTRICLE GLOBAL LONGITUDINAL STRAIN: -12.2 %
BH CV ECHO MEAS - ACS: 2 CM
BH CV ECHO MEAS - AO MAX PG: 2.9 MMHG
BH CV ECHO MEAS - AO ROOT AREA (BSA CORRECTED): 1.3 CM2
BH CV ECHO MEAS - AO ROOT DIAM: 2.8 CM
BH CV ECHO MEAS - AO V2 VTI: 15.8 CM
BH CV ECHO MEAS - AVA(I,D): 2.16 CM2
BH CV ECHO MEAS - EDV(CUBED): 60.4 ML
BH CV ECHO MEAS - EDV(MOD-SP2): 120 ML
BH CV ECHO MEAS - EDV(MOD-SP4): 116 ML
BH CV ECHO MEAS - EF(MOD-SP2): 35 %
BH CV ECHO MEAS - EF(MOD-SP4): 34.5 %
BH CV ECHO MEAS - ESV(CUBED): 60.4 ML
BH CV ECHO MEAS - ESV(MOD-SP2): 78 ML
BH CV ECHO MEAS - ESV(MOD-SP4): 76 ML
BH CV ECHO MEAS - FS: -0.03 %
BH CV ECHO MEAS - IVS/LVPW: 1.03 CM
BH CV ECHO MEAS - IVSD: 0.9 CM
BH CV ECHO MEAS - LAT PEAK E' VEL: 14.6 CM/SEC
BH CV ECHO MEAS - LV DIASTOLIC VOL/BSA (35-75): 52 CM2
BH CV ECHO MEAS - LV MASS(C)D: 104.4 GRAMS
BH CV ECHO MEAS - LV MAX PG: 2.15 MMHG
BH CV ECHO MEAS - LV MEAN PG: 1 MMHG
BH CV ECHO MEAS - LV SYSTOLIC VOL/BSA (12-30): 34.1 CM2
BH CV ECHO MEAS - LV V1 MAX: 73.3 CM/SEC
BH CV ECHO MEAS - LV V1 VTI: 11.8 CM
BH CV ECHO MEAS - LVIDD: 3.9 CM
BH CV ECHO MEAS - LVIDS: 3.9 CM
BH CV ECHO MEAS - LVOT AREA: 2.9 CM2
BH CV ECHO MEAS - LVOT DIAM: 1.92 CM
BH CV ECHO MEAS - LVPWD: 0.88 CM
BH CV ECHO MEAS - MED PEAK E' VEL: 16.5 CM/SEC
BH CV ECHO MEAS - MR MAX PG: 83.2 MMHG
BH CV ECHO MEAS - MR MAX VEL: 456.1 CM/SEC
BH CV ECHO MEAS - MV DEC SLOPE: 273.2 CM/SEC2
BH CV ECHO MEAS - MV DEC TIME: 0.13 SEC
BH CV ECHO MEAS - MV E MAX VEL: 95.5 CM/SEC
BH CV ECHO MEAS - MV MAX PG: 2.5 MMHG
BH CV ECHO MEAS - MV MEAN PG: 1.09 MMHG
BH CV ECHO MEAS - MV P1/2T: 87.3 MSEC
BH CV ECHO MEAS - MV V2 VTI: 19.6 CM
BH CV ECHO MEAS - MVA(P1/2T): 2.5 CM2
BH CV ECHO MEAS - MVA(VTI): 1.75 CM2
BH CV ECHO MEAS - PA ACC TIME: 0.18 SEC
BH CV ECHO MEAS - PA V2 MAX: 52.4 CM/SEC
BH CV ECHO MEAS - PULM DIAS VEL: 36.3 CM/SEC
BH CV ECHO MEAS - PULM S/D: 0.56
BH CV ECHO MEAS - PULM SYS VEL: 20.4 CM/SEC
BH CV ECHO MEAS - QP/QS: 0.77
BH CV ECHO MEAS - RAP SYSTOLE: 15 MMHG
BH CV ECHO MEAS - RV MAX PG: 0.31 MMHG
BH CV ECHO MEAS - RV V1 MAX: 27.9 CM/SEC
BH CV ECHO MEAS - RV V1 VTI: 6.3 CM
BH CV ECHO MEAS - RVOT DIAM: 2.31 CM
BH CV ECHO MEAS - RVSP: 29 MMHG
BH CV ECHO MEAS - SV(LVOT): 34.1 ML
BH CV ECHO MEAS - SV(MOD-SP2): 42 ML
BH CV ECHO MEAS - SV(MOD-SP4): 40 ML
BH CV ECHO MEAS - SV(RVOT): 26.4 ML
BH CV ECHO MEAS - SVI(LVOT): 15.3 ML/M2
BH CV ECHO MEAS - SVI(MOD-SP2): 18.8 ML/M2
BH CV ECHO MEAS - SVI(MOD-SP4): 17.9 ML/M2
BH CV ECHO MEAS - TAPSE (>1.6): 1.31 CM
BH CV ECHO MEAS - TR MAX PG: 14 MMHG
BH CV ECHO MEAS - TR MAX VEL: 187 CM/SEC
BH CV ECHO MEASUREMENTS AVERAGE E/E' RATIO: 6.14
BH CV XLRA - RV BASE: 3.6 CM
BH CV XLRA - RV LENGTH: 8.1 CM
BH CV XLRA - RV MID: 2.7 CM
BH CV XLRA - TDI S': 6.4 CM/SEC
BILIRUB SERPL-MCNC: 0.6 MG/DL (ref 0–1.2)
BUN SERPL-MCNC: 20 MG/DL (ref 8–23)
BUN/CREAT SERPL: 14 (ref 7–25)
CALCIUM SPEC-SCNC: 9.5 MG/DL (ref 8.6–10.5)
CHLORIDE SERPL-SCNC: 104 MMOL/L (ref 98–107)
CO2 SERPL-SCNC: 26.1 MMOL/L (ref 22–29)
CREAT SERPL-MCNC: 1.43 MG/DL (ref 0.76–1.27)
DEPRECATED RDW RBC AUTO: 40.4 FL (ref 37–54)
EGFRCR SERPLBLD CKD-EPI 2021: 56.1 ML/MIN/1.73
EOSINOPHIL # BLD AUTO: 0.09 10*3/MM3 (ref 0–0.4)
EOSINOPHIL NFR BLD AUTO: 1.5 % (ref 0.3–6.2)
ERYTHROCYTE [DISTWIDTH] IN BLOOD BY AUTOMATED COUNT: 12.3 % (ref 12.3–15.4)
GLOBULIN UR ELPH-MCNC: 2.7 GM/DL
GLUCOSE SERPL-MCNC: 107 MG/DL (ref 65–99)
HCT VFR BLD AUTO: 45.9 % (ref 37.5–51)
HGB BLD-MCNC: 15.7 G/DL (ref 13–17.7)
HOLD SPECIMEN: NORMAL
HOLD SPECIMEN: NORMAL
IMM GRANULOCYTES # BLD AUTO: 0.02 10*3/MM3 (ref 0–0.05)
IMM GRANULOCYTES NFR BLD AUTO: 0.3 % (ref 0–0.5)
LEFT ATRIUM VOLUME INDEX: 27.1 ML/M2
LV EF BIPLANE MOD: 36.1 %
LYMPHOCYTES # BLD AUTO: 1.95 10*3/MM3 (ref 0.7–3.1)
LYMPHOCYTES NFR BLD AUTO: 32.3 % (ref 19.6–45.3)
MAGNESIUM SERPL-MCNC: 2.3 MG/DL (ref 1.6–2.4)
MCH RBC QN AUTO: 31.1 PG (ref 26.6–33)
MCHC RBC AUTO-ENTMCNC: 34.2 G/DL (ref 31.5–35.7)
MCV RBC AUTO: 90.9 FL (ref 79–97)
MONOCYTES # BLD AUTO: 0.67 10*3/MM3 (ref 0.1–0.9)
MONOCYTES NFR BLD AUTO: 11.1 % (ref 5–12)
NEUTROPHILS NFR BLD AUTO: 3.28 10*3/MM3 (ref 1.7–7)
NEUTROPHILS NFR BLD AUTO: 54.3 % (ref 42.7–76)
NRBC BLD AUTO-RTO: 0 /100 WBC (ref 0–0.2)
NT-PROBNP SERPL-MCNC: 558 PG/ML (ref 0–900)
PLATELET # BLD AUTO: 206 10*3/MM3 (ref 140–450)
PMV BLD AUTO: 10.3 FL (ref 6–12)
POTASSIUM SERPL-SCNC: 4.4 MMOL/L (ref 3.5–5.2)
PROT SERPL-MCNC: 6.9 G/DL (ref 6–8.5)
RBC # BLD AUTO: 5.05 10*6/MM3 (ref 4.14–5.8)
SINUS: 3.1 CM
SODIUM SERPL-SCNC: 139 MMOL/L (ref 136–145)
STJ: 2.7 CM
TROPONIN T SERPL HS-MCNC: 13 NG/L
TSH SERPL DL<=0.05 MIU/L-ACNC: 1.88 UIU/ML (ref 0.27–4.2)
WBC NRBC COR # BLD AUTO: 6.04 10*3/MM3 (ref 3.4–10.8)
WHOLE BLOOD HOLD COAG: NORMAL
WHOLE BLOOD HOLD SPECIMEN: NORMAL

## 2025-07-25 PROCEDURE — 83735 ASSAY OF MAGNESIUM: CPT | Performed by: EMERGENCY MEDICINE

## 2025-07-25 PROCEDURE — 99285 EMERGENCY DEPT VISIT HI MDM: CPT

## 2025-07-25 PROCEDURE — 93306 TTE W/DOPPLER COMPLETE: CPT | Performed by: INTERNAL MEDICINE

## 2025-07-25 PROCEDURE — 93005 ELECTROCARDIOGRAM TRACING: CPT | Performed by: EMERGENCY MEDICINE

## 2025-07-25 PROCEDURE — 80050 GENERAL HEALTH PANEL: CPT | Performed by: EMERGENCY MEDICINE

## 2025-07-25 PROCEDURE — 71045 X-RAY EXAM CHEST 1 VIEW: CPT

## 2025-07-25 PROCEDURE — 25510000001 PERFLUTREN 6.52 MG/ML SUSPENSION 2 ML VIAL: Performed by: INTERNAL MEDICINE

## 2025-07-25 PROCEDURE — 93356 MYOCRD STRAIN IMG SPCKL TRCK: CPT | Performed by: INTERNAL MEDICINE

## 2025-07-25 PROCEDURE — 93010 ELECTROCARDIOGRAM REPORT: CPT | Performed by: INTERNAL MEDICINE

## 2025-07-25 PROCEDURE — 84484 ASSAY OF TROPONIN QUANT: CPT | Performed by: EMERGENCY MEDICINE

## 2025-07-25 PROCEDURE — 94760 N-INVAS EAR/PLS OXIMETRY 1: CPT

## 2025-07-25 PROCEDURE — 93306 TTE W/DOPPLER COMPLETE: CPT

## 2025-07-25 PROCEDURE — 99284 EMERGENCY DEPT VISIT MOD MDM: CPT

## 2025-07-25 PROCEDURE — 93356 MYOCRD STRAIN IMG SPCKL TRCK: CPT

## 2025-07-25 PROCEDURE — 93005 ELECTROCARDIOGRAM TRACING: CPT

## 2025-07-25 PROCEDURE — 25810000003 SODIUM CHLORIDE 0.9 % SOLUTION: Performed by: EMERGENCY MEDICINE

## 2025-07-25 PROCEDURE — 83880 ASSAY OF NATRIURETIC PEPTIDE: CPT | Performed by: INTERNAL MEDICINE

## 2025-07-25 RX ORDER — METOPROLOL SUCCINATE 50 MG/1
50 TABLET, EXTENDED RELEASE ORAL DAILY
Qty: 90 TABLET | Refills: 3 | Status: SHIPPED | OUTPATIENT
Start: 2025-07-25

## 2025-07-25 RX ORDER — METOPROLOL TARTRATE 25 MG/1
25 TABLET, FILM COATED ORAL ONCE
Status: COMPLETED | OUTPATIENT
Start: 2025-07-25 | End: 2025-07-25

## 2025-07-25 RX ORDER — SODIUM CHLORIDE 0.9 % (FLUSH) 0.9 %
10 SYRINGE (ML) INJECTION AS NEEDED
Status: DISCONTINUED | OUTPATIENT
Start: 2025-07-25 | End: 2025-07-25 | Stop reason: HOSPADM

## 2025-07-25 RX ADMIN — METOPROLOL TARTRATE 25 MG: 25 TABLET, FILM COATED ORAL at 09:55

## 2025-07-25 RX ADMIN — SODIUM CHLORIDE 500 ML: 9 INJECTION, SOLUTION INTRAVENOUS at 10:06

## 2025-07-25 RX ADMIN — PERFLUTREN 2 ML: 6.52 INJECTION, SUSPENSION INTRAVENOUS at 11:37

## 2025-07-25 NOTE — ASSESSMENT & PLAN NOTE
The 10-year ASCVD risk score (Amanda LEON, et al., 2019) is: 10.6%    Values used to calculate the score:      Age: 59 years      Sex: Male      Is Non- : No      Diabetic: No      Tobacco smoker: No      Systolic Blood Pressure: 125 mmHg      Is BP treated: No      HDL Cholesterol: 41 mg/dL      Total Cholesterol: 225 mg/dL  Lipo A, Lipid panel ordered

## 2025-07-25 NOTE — PROGRESS NOTES
"Chief Complaint  Annual Exam    Subjective        Amadeo Leary presents to [facility]  History of Present Illness  This is a 58 y/o male presenting to office for CPE    Reports some exercise     Colonoscopy UTD 9/2023; due in 2033  PSA today     Hx BPH-- continues on flomax; reports he is able to empty his bladder but sometimes still having some urine hesitancy at times. He reports improvement when he increases his water intake.     Reports he has noticed increased diarrhea after higher fatty meals; he reports he has had some looser stools-- reports this seems to follow more fattier food intake. He had previous normal HIDA scan in 2022. He reports any time he eats more fried food, he will struggle with diarrhea.     UTD with dental exam       ADDENDUM-- At time of P/E-- it was noted patient had irregular heart rate. EKG was ordered indicating A-fib with RVR with rate of 120. This is new; last EKG 8/2023 showed slight bradycardia; he had negative stress test Aug 2024 as well. He does have positive family history of this in brother and 2 nephews; he denies any current syncopal or presyncope feelings; he denies any CP or SOA. He does report he has been feeling fatigued. He is refusing ambulance service at this time and is amendable to go to ER. Report was called to BEKA Young, at [facility].     Objective   Vital Signs:  /70 (BP Location: Left arm, Patient Position: Sitting, Cuff Size: Adult)   Pulse 60   Ht 182.9 cm (72.01\")   Wt 101 kg (223 lb)   SpO2 96%   BMI 30.24 kg/m²   Estimated body mass index is 30.24 kg/m² as calculated from the following:    Height as of this encounter: 182.9 cm (72.01\").    Weight as of this encounter: 101 kg (223 lb).    BMI is >= 30 and <35. (Class 1 Obesity). The following options were offered after discussion;: exercise counseling/recommendations and nutrition counseling/recommendations      Physical Exam  Constitutional:       " Appearance: Normal appearance.   HENT:      Head: Normocephalic and atraumatic.      Right Ear: External ear normal.      Left Ear: External ear normal.      Nose: Nose normal.      Mouth/Throat:      Mouth: Mucous membranes are moist.      Pharynx: Oropharynx is clear.   Eyes:      Conjunctiva/sclera: Conjunctivae normal.      Pupils: Pupils are equal, round, and reactive to light.      Comments: +glasses   Cardiovascular:      Rate and Rhythm: Rhythm irregularly irregular.   Pulmonary:      Effort: Pulmonary effort is normal. No respiratory distress.      Breath sounds: Normal breath sounds. No wheezing or rales.   Abdominal:      General: There is no distension.      Tenderness: There is no abdominal tenderness.   Musculoskeletal:         General: No swelling.      Cervical back: Normal range of motion and neck supple.   Skin:     General: Skin is dry.      Capillary Refill: Capillary refill takes less than 2 seconds.   Neurological:      Mental Status: He is alert and oriented to person, place, and time. Mental status is at baseline.   Psychiatric:         Mood and Affect: Mood normal.         Thought Content: Thought content normal.         Judgment: Judgment normal.        Result Review :  The following data was reviewed by: KEELEY Rivera on 07/25/2025:  Common labs          8/9/2024    12:48   Common Labs   Glucose 91    BUN 19    Creatinine 1.16    Sodium 140    Potassium 4.3    Chloride 102    Calcium 9.5      Tobacco Use: Low Risk  (7/25/2025)    Patient History     Smoking Tobacco Use: Never     Smokeless Tobacco Use: Never     Passive Exposure: Not on file     Social History     Substance and Sexual Activity   Alcohol Use Yes    Alcohol/week: 1.0 standard drink of alcohol    Types: 1 Cans of beer per week    Comment: occasionally     Family History   Problem Relation Age of Onset    Heart attack Father         Age 47   Heavy smoker     Heart disease Father     Colon polyps Mother     Hyperlipidemia  Mother     Sleep apnea Brother     Sleep apnea Sister     Colon cancer Neg Hx     Prostate cancer Neg Hx     Malig Hyperthermia Neg Hx       The 10-year ASCVD risk score (Amanda LEON, et al., 2019) is: 10.6%    Values used to calculate the score:      Age: 59 years      Sex: Male      Is Non- : No      Diabetic: No      Tobacco smoker: No      Systolic Blood Pressure: 125 mmHg      Is BP treated: No      HDL Cholesterol: 41 mg/dL      Total Cholesterol: 225 mg/dL      ECG 12 Lead (07/25/2025 9:12 AM)      Adult ECG Report     Name: Amadeo Leary   Age: 59 y.o.   Gender: male       Rate: 120   Rhythm: A-fib with RVR   QRS Axis: 28   HI Interval: -   QRS Duration: 100   QTc: 497   Voltages: -   Conduction Disturbances: -   Other Abnormalities: prolonged QTC     Narrative Interpretation: A-fib with RVR  This is new compared to previous EKG records and studies     Assessment and Plan   Diagnoses and all orders for this visit:    1. Annual physical exam (Primary)  Assessment & Plan:  Current recommendations according to the current Physical Activity Guidelines for Americans: adults need 150-300 minutes of physical exercise weekly. It is also recommended to perform two sessions of full body strength training exercise weekly which includes all major muscle groups including legs, hips, back, abdomen, chest, shoulders, and arms.   Current CDC recommendations for diet include following a diet that emphasizes fruits, vegetables, whole grains that is low in saturated fats and low in sugar intake.   Adults should consume at least 3 cup equivalents of fruit and vegetables daily. It is also beneficial to get 25 grams of fiber daily unless told otherwise by your healthcare provider.   Labs ordered-- sending to ER for further acute evaluation  Anticipatory guidance given regarding health prevention/wellness, diet/exercise, tobacco/alcohol/drug education, exercise and wellbeing, vaccination recommendations,  and sexual health/STD education.   Recommended bi-yearly dental exams and regular vision examinations.       Orders:  -     Cancel: CBC & Differential  -     Cancel: Comprehensive Metabolic Panel  -     Cancel: TSH Rfx On Abnormal To Free T4  -     Cancel: TSH Rfx On Abnormal To Free T4  -     Cancel: Comprehensive Metabolic Panel  -     Cancel: CBC & Differential    2. Atrial fibrillation with rapid ventricular response  Assessment & Plan:  This is new  EKG in office shows rate of 120  Sending to ER for further acute evaluation and tx         3. Irregular heart rate  -     ECG 12 Lead    4. Mixed hyperlipidemia  Assessment & Plan:  The 10-year ASCVD risk score (Amanda LEON, et al., 2019) is: 10.6%    Values used to calculate the score:      Age: 59 years      Sex: Male      Is Non- : No      Diabetic: No      Tobacco smoker: No      Systolic Blood Pressure: 125 mmHg      Is BP treated: No      HDL Cholesterol: 41 mg/dL      Total Cholesterol: 225 mg/dL  Lipo A, Lipid panel ordered      Orders:  -     Cancel: Lipid Panel  -     Cancel: Lipid Panel  -     Cancel: Lipoprotein A (LPA)  -     Lipoprotein A (LPA); Future  -     Lipid panel; Future    5. Prediabetes  Assessment & Plan:  Lab pending  Continue with low glycemic diet choices including reducing refined carbohydrates and simple sugars in diet. Recommended 150 minutes weekly exercise or as tolerated.       Orders:  -     Cancel: Hemoglobin A1c  -     Cancel: Hemoglobin A1c  -     Hemoglobin A1c; Future    6. Benign prostatic hyperplasia with urinary hesitancy  Assessment & Plan:  Continues on flomax   Prev seen by urology  PSA screening ordered      7. Irritable bowel syndrome with diarrhea  Assessment & Plan:  Continues on colestid.   Following with GI  Recommend avoiding trigger foods  Recommended trial of [email or web address] colon health probiotic       8. Anxiety and depression  Assessment & Plan:  Continues on vraylar, desvenlafaxine  Denies  SI  Following with behavioral health      9. Measles, mumps, rubella (MMR) vaccination status unknown  -     Cancel: Measles / Mumps / Rubella Immunity  -     Measles / Mumps / Rubella Immunity; Future    10. Screening PSA (prostate specific antigen)  -     Cancel: PSA SCREENING  -     Cancel: PSA SCREENING  -     PSA SCREENING; Future    11. Attention deficit hyperactivity disorder (ADHD), unspecified ADHD type  Assessment & Plan:  Following with psychiatry  Continues on adderall XR       Other orders  -     Cancel: Pneumococcal Conjugate Vaccine 21 (18+ yrs)             Follow Up   No follow-ups on file.  Patient was given instructions and counseling regarding his condition or for health maintenance advice. Please see specific information pulled into the AVS if appropriate.

## 2025-07-25 NOTE — ASSESSMENT & PLAN NOTE
This is new  EKG in office shows rate of 120  Sending to ER for further acute evaluation and tx

## 2025-07-25 NOTE — ED TRIAGE NOTES
"Pt to ED from PCP for physical. Pt stated yesterday he was helping his son move apartments and felt \"winded\" and fatigue at times.     Pt has no complaints at time time, ambulatory, no CP, no SOB, no dizziness.   "

## 2025-07-25 NOTE — ASSESSMENT & PLAN NOTE
Lab pending  Continue with low glycemic diet choices including reducing refined carbohydrates and simple sugars in diet. Recommended 150 minutes weekly exercise or as tolerated.

## 2025-07-25 NOTE — PROGRESS NOTES
Patient Name: Amadeo Leary  :1965  59 y.o.    Date of Admission: 2025  Encounter Provider: Jessie Ricks MD  Date of Encounter Visit: 25  Place of Service: [facility] CARDIOLOGY CARDIAC EVALUATION CLINIC  Referring Provider: No ref. provider found      Chief complaint:  Atrial fibrillation    History of Present Illness:    This is a gentleman with a family history of atrial fibrillation.He had a heart catheterization in  which showed normal coronary arteries.  He had an exercise nuclear stress test in 2024 which showed normal LV function and no evidence of ischemia.  He exercised for 9 minutes on the Martin protocol.    He was having a routine office visit this morning and was found to be in rapid atrial fibrillation and was sent to the emergency room.  There he had a normal TSH, negative troponin, normal magnesium, CMP with some elevated creatinine which was treated with some IV fluids.  CBC was normal.    He says that he has been noticing some more dyspnea on exertion.  Yesterday he was helping to move some items into a dorm at .  It was very hot but he said he had to stop and catch his breath more than he thought was normal but did not pay that much attention to it.  He denies any palpitations.  He has had an occasional palpitation for about 20 years which sounds consistent with a PVC but nothing more recently.  No edema.    Past Medical History:   Diagnosis Date    Abnormal cardiovascular stress test 2016    Abnormal EKG 2015    ADHD (attention deficit hyperactivity disorder) 2023    Allergic rhinitis     Anxiety     Benign prostatic hyperplasia     Colon polyps     FOLLOWED BY DR. KELSEY BLANCO    Depression     Eardrum rupture, left 2016    Elevated LDL cholesterol level 2015    Hemorrhoids     Rectal mass 2022    BENIGN POLYPOID MASS, FOLLOWED BY DR. KLESEY BLANCO    Seasonal allergies        Past Surgical History:    Procedure Laterality Date    CARDIAC CATHETERIZATION Left 03/12/2015    Normal coronary arteries, normal left ventricular systolic function.DR. CRISTINA HARMAN AT [facility]    COLONOSCOPY N/A 08/26/2016    1 TUBULAR ADENOMA POLYP IN TRANSVERSE, DR. CLAY MORALES AT [facility]    COLONOSCOPY N/A 01/12/2022    1 TUBULAR ADENOMA POLYP AT 80CM, 1 TUBULAR ADENOMA POLYP IN CECUM, 1 BENIGN POLYP IN RECTUM, 8 MM POLYPOID MASS IN RECTUM ABOVE HEMORRHOIDS, PATH:BENIGN, MILD INTERNAL HEMORRHOIDS, DR. MALCOLM CONNELL AT [facility]    COLONOSCOPY N/A 11/10/2006    DR. JEREMY PALMER AT [place]    COLONOSCOPY N/A 09/06/2023    ENTIRE COLON WNL, RESCOPE IN 5 YRS, DR. KELSEY BLANCO AT [facility]    EAR TUBE REMOVAL Left 03/21/2017    DR. MARK SEVERTSON    EAR TUBE REMOVAL Left 06/06/2017    DR. MARK SEVERTSON    EAR TUBES Bilateral 2003    MYRINGOPLASTY Left 03/21/2017    WITH BILATERAL EAR TUBES, DR. MARK SEVERTSON    SHOULDER ARTHROSCOPY W/ LABRAL REPAIR Left 08/28/2020    WITH CHONDROPLASTY, ACROMIOPLASTY, AND EXCISION OF CALCIFIC DEPOSIT, DR. NALLELY LUGO    SIGMOIDOSCOPY N/A 02/24/2022    18 MM TUBULAR ADENOMA POLYP IN DISTAL RECTUM, RESCOPE IN 1 YR, DR. KELSEY BLANCO AT [facility]    SIGMOIDOSCOPY N/A 08/31/2022    5 MM TUBULAR ADENOMA POLYP IN DISTAL RECTUM, RESCOPE IN 1 YR, DR. KELSEY BLANCO AT [facility]    VASECTOMY           Prior to Admission medications    Medication Sig Start Date End Date Taking? Authorizing Provider   Adderall XR 20 MG 24 hr capsule Take 1 capsule by mouth Every Morning 9/26/22   ProviderBlaze MD   Cariprazine HCl (VRAYLAR) 1.5 MG capsule capsule Take 1 capsule by mouth Daily.    ProviderBlaze MD   colestipol (COLESTID) 1 g tablet Start with one pill daily and increase to 2 daily if no improvement after 2 weeks 11/20/24   Aicha Dsouza APRN   Desvenlafaxine Succinate ER 25 MG tablet sustained-release 24 hour Take 1 tablet by mouth Daily.    Blaze Lilly MD   metoprolol succinate XL (Toprol XL) 50 MG 24 hr tablet  Take 1 tablet by mouth Daily. 7/25/25   Jessie Ricks MD   rivaroxaban (XARELTO) 20 MG tablet Take 1 tablet by mouth Daily With Dinner. 7/25/25   eJssie Ricks MD   tamsulosin (FLOMAX) 0.4 MG capsule 24 hr capsule Take 1 capsule by mouth Daily. 1/9/25   Magnolia Kilpatrick APRN       Allergies   Allergen Reactions    Levaquin [Levofloxacin In D5w] Itching       Social History     Socioeconomic History    Marital status:    Tobacco Use    Smoking status: Never    Smokeless tobacco: Never   Vaping Use    Vaping status: Never Used   Substance and Sexual Activity    Alcohol use: Yes     Alcohol/week: 1.0 standard drink of alcohol     Types: 1 Cans of beer per week     Comment: occasionally    Drug use: No    Sexual activity: Yes     Partners: Female     Comment:         Family History   Problem Relation Age of Onset    Heart attack Father         Age 47   Heavy smoker     Heart disease Father     Colon polyps Mother     Hyperlipidemia Mother     Sleep apnea Brother     Sleep apnea Sister     Colon cancer Neg Hx     Prostate cancer Neg Hx     Malig Hyperthermia Neg Hx             Objective:     Vitals:    07/25/25 1034 07/25/25 1035   BP: 106/76    BP Location: Left arm    Patient Position: Sitting    Pulse: 90 110   Resp: 16    SpO2: 99%      There is no height or weight on file to calculate BMI.    Physical Exam      Lab Review:     Results from last 7 days   Lab Units 07/25/25  0935   SODIUM mmol/L 139   POTASSIUM mmol/L 4.4   CHLORIDE mmol/L 104   CO2 mmol/L 26.1   BUN mg/dL 20.0   CREATININE mg/dL 1.43*   CALCIUM mg/dL 9.5   BILIRUBIN mg/dL 0.6   ALK PHOS U/L 58   ALT (SGPT) U/L 56*   AST (SGOT) U/L 39   GLUCOSE mg/dL 107*     Results from last 7 days   Lab Units 07/25/25  0935   HSTROP T ng/L 13     Results from last 7 days   Lab Units 07/25/25  0935   WBC 10*3/mm3 6.04   HEMOGLOBIN g/dL 15.7   HEMATOCRIT % 45.9   PLATELETS 10*3/mm3 206         Results from last 7 days   Lab Units 07/25/25  0935    MAGNESIUM mg/dL 2.3           Procedures        Assessment and Plan:       1.  New onset atrial fibrillation.  Mildly tachycardic.  I am going to start him on Toprol XL 50 mg once a day.  He has a very low [id number] score however, if he does not spontaneously convert, would like to cardiovert him in about a month and give him a chance to get back into normal rhythm.  Therefore, I am going to start him on Xarelto 20 mg a day.  I am going to have him wear a Zio patch.  2.  New onset cardiomyopathy.  Ejection fraction about 30%.  I suspect this is tachycardia mediated.  He had a normal heart cath in 2015 and normal stress test in 2024.  He is not having any anginal symptoms.  Plan will be to reassess his LV function after he has been rate and/or rhythm controlled.  He is not having symptoms of fluid overload and his BNP is normal.  If anything I suspect he might be a little dehydrated based on his elevated creatinine over his normally mildly elevated creatinine and being outside working and sweating in the heat yesterday.  3.  Baseline mildly elevated creatinine.  4.  Hyperlipidemia.  Not currently treated.  I reviewed his lipid panel from last year and his LDL was 52 with triglycerides of 178.    CHADS-VASc Risk Assessment               0 Total Score    Criteria that do not apply:    CHF    Hypertension    Age >/= 75    DM    PRIOR STROKE/TIA/THROMBO    Vascular Disease    Age 65-74    Sex: Female          He is going to follow-up with me in a month so we can go over his Zio patch, recheck an EKG and determine need for cardioversion.  He will need a repeat echo at some point to reassess his LV function.  A lot of this will depend on how we are doing with rate and rhythm control.  He is going to monitor his heart rate and blood pressure at home and let me know if his heart rate is running greater than 100 bpm or if his blood pressure is getting low.    Jessie Ricks MD  07/25/25  13:11 EDT

## 2025-07-25 NOTE — ASSESSMENT & PLAN NOTE
Current recommendations according to the current Physical Activity Guidelines for Americans: adults need 150-300 minutes of physical exercise weekly. It is also recommended to perform two sessions of full body strength training exercise weekly which includes all major muscle groups including legs, hips, back, abdomen, chest, shoulders, and arms.   Current CDC recommendations for diet include following a diet that emphasizes fruits, vegetables, whole grains that is low in saturated fats and low in sugar intake.   Adults should consume at least 3 cup equivalents of fruit and vegetables daily. It is also beneficial to get 25 grams of fiber daily unless told otherwise by your healthcare provider.   Labs ordered-- sending to ER for further acute evaluation  Anticipatory guidance given regarding health prevention/wellness, diet/exercise, tobacco/alcohol/drug education, exercise and wellbeing, vaccination recommendations, and sexual health/STD education.   Recommended bi-yearly dental exams and regular vision examinations.

## 2025-07-25 NOTE — ED PROVIDER NOTES
EMERGENCY DEPARTMENT ENCOUNTER  Room Number:  31/31  PCP: Magnolia Kilpatrick APRN  Independent Historians: Patient      HPI:  Chief Complaint: had concerns including Irregular Heart Beat.     A complete HPI/ROS/PMH/PSH/SH/FH are unobtainable due to:   Chronic or social conditions impacting patient care (Social Determinants of Health):       Context: Amadeo Leary is a 59 y.o. male with a medical history of hyperlipidemia, anxiety and depression who presents to the ED c/o acute fatigue yesterday.  Patient helped his son move into a new lodging in [place] yesterday.  Saw primary care provider due to some increased fatigue.  He was found to be in atrial fibrillation with rapid ventricular response and sent here.  He denies any chest pain or shortness of breath.  He is unsure how long symptoms have been going on.  Denies any history of prior cardiac arrhythmia.      Review of prior external notes (non-ED) -and- Review of prior external test results outside of this encounter:   I reviewed prior medical records including office visit with primary care provider, KEELEY Kilpatrick from earlier today.  Patient seen for Increase exertion and found to have new onset atrial fibrillation    Prescription drug monitoring program review:         PAST MEDICAL HISTORY  Active Ambulatory Problems     Diagnosis Date Noted    Allergic rhinitis 03/16/2016    Tubular adenoma of colon 07/14/2021    Colon polyp 10/22/2021    Anxiety and depression 01/10/2022    Rectal polyp 01/28/2022    Mixed conductive and sensorineural hearing loss of left ear with unrestricted hearing of right ear 02/11/2022    History of colon polyps 03/29/2022    Irritable bowel syndrome with diarrhea 07/19/2022    Annual physical exam 07/19/2022    Class 1 obesity due to excess calories with serious comorbidity and body mass index (BMI) of 31.0 to 31.9 in adult 07/19/2022    Other fatigue 07/19/2022    Benign prostatic hyperplasia with urinary hesitancy 10/20/2022     Family history of colonic polyps 07/06/2023    Prediabetes 07/20/2023    Mixed hyperlipidemia 07/20/2023    Family history of early CAD 08/20/2024    Atrial fibrillation with rapid ventricular response 07/25/2025    ADHD (attention deficit hyperactivity disorder)      Resolved Ambulatory Problems     Diagnosis Date Noted    Abnormal cardiovascular stress test 03/16/2016    Chest pain 03/16/2016    Left non-suppurative otitis media 01/10/2022    Acute effusion of right ear 02/11/2022    Viral upper respiratory tract infection 11/30/2022    Non-recurrent acute serous otitis media of left ear 01/29/2024    GILL (dyspnea on exertion) 07/24/2024     Past Medical History:   Diagnosis Date    Abnormal EKG 03/2015    Anxiety     Benign prostatic hyperplasia     Colon polyps     Depression     Eardrum rupture, left 11/2016    Elevated LDL cholesterol level 03/2015    Hemorrhoids     Rectal mass 01/2022    Seasonal allergies          PAST SURGICAL HISTORY  Past Surgical History:   Procedure Laterality Date    CARDIAC CATHETERIZATION Left 03/12/2015    Normal coronary arteries, normal left ventricular systolic function.DR. CRISTINA HARMAN AT [facility]    COLONOSCOPY N/A 08/26/2016    1 TUBULAR ADENOMA POLYP IN TRANSVERSE, DR. CLAY MORALES AT [facility]    COLONOSCOPY N/A 01/12/2022    1 TUBULAR ADENOMA POLYP AT 80CM, 1 TUBULAR ADENOMA POLYP IN CECUM, 1 BENIGN POLYP IN RECTUM, 8 MM POLYPOID MASS IN RECTUM ABOVE HEMORRHOIDS, PATH:BENIGN, MILD INTERNAL HEMORRHOIDS, DR. MALCOLM CONNELL AT [facility]    COLONOSCOPY N/A 11/10/2006    DR. JEREMY PALMER AT [place]    COLONOSCOPY N/A 09/06/2023    ENTIRE COLON WNL, RESCOPE IN 5 YRS, DR. KELSEY BLANCO AT [facility]    EAR TUBE REMOVAL Left 03/21/2017    DR. MARK SEVERTSON    EAR TUBE REMOVAL Left 06/06/2017    DR. MARK SEVERTSON    EAR TUBES Bilateral 2003    MYRINGOPLASTY Left 03/21/2017    WITH BILATERAL EAR TUBES, DR. MARK SEVERTSON    SHOULDER ARTHROSCOPY W/ LABRAL REPAIR Left 08/28/2020    WITH CHONDROPLASTY,  ACROMIOPLASTY, AND EXCISION OF CALCIFIC DEPOSIT, DR. NALLELY LUGO    SIGMOIDOSCOPY N/A 02/24/2022    18 MM TUBULAR ADENOMA POLYP IN DISTAL RECTUM, RESCOPE IN 1 YR, DR. KELSEY BLANCO AT [facility]    SIGMOIDOSCOPY N/A 08/31/2022    5 MM TUBULAR ADENOMA POLYP IN DISTAL RECTUM, RESCOPE IN 1 YR, DR. KELSEY BLANCO AT [facility]    VASECTOMY           FAMILY HISTORY  Family History   Problem Relation Age of Onset    Heart attack Father         Age 47   Heavy smoker     Heart disease Father     Colon polyps Mother     Hyperlipidemia Mother     Sleep apnea Brother     Sleep apnea Sister     Colon cancer Neg Hx     Prostate cancer Neg Hx     Malig Hyperthermia Neg Hx          SOCIAL HISTORY  Social History     Socioeconomic History    Marital status:    Tobacco Use    Smoking status: Never    Smokeless tobacco: Never   Vaping Use    Vaping status: Never Used   Substance and Sexual Activity    Alcohol use: Yes     Alcohol/week: 1.0 standard drink of alcohol     Types: 1 Cans of beer per week     Comment: occasionally    Drug use: No    Sexual activity: Yes     Partners: Female     Comment:           ALLERGIES  Levaquin [levofloxacin in d5w]      REVIEW OF SYSTEMS  Review of Systems   Constitutional:  Positive for fatigue. Negative for fever.   Respiratory:  Negative for shortness of breath.    Cardiovascular:  Negative for chest pain.   All other systems reviewed and are negative.    Included in HPI  All systems reviewed and negative except for those discussed in HPI.      PHYSICAL EXAM    I have reviewed the triage vital signs and nursing notes.    ED Triage Vitals   Temp Heart Rate Resp BP SpO2   07/25/25 0916 07/25/25 0916 07/25/25 0916 07/25/25 0920 07/25/25 0916   97.6 °F (36.4 °C) 78 18 110/78 98 %      Temp src Heart Rate Source Patient Position BP Location FiO2 (%)   07/25/25 0916 07/25/25 0916 -- 07/25/25 0920 --   Oral Monitor  Right arm        Physical Exam  GENERAL: Alert well-appearing male no obvious distress.   Triage vitals reviewed and are fairly benign.  Pulse at triage 78, blood pressure 110/78  SKIN: Warm, dry  HENT: Normocephalic, atraumatic  EYES: no scleral icterus  CV:  irregularly irregular with pulse ranging from the 100s to 150s during our exam, no murmur  RESPIRATORY: normal effort, lungs clear-O2 sats upper 90s room air  ABDOMEN: soft, nontender, nondistended  MUSCULOSKELETAL: no deformity-no significant swelling or tenderness to palpation  NEURO: alert, moves all extremities, follows commands      LAB RESULTS  Recent Results (from the past 24 hours)   ECG 12 Lead ED Triage Standing Order; Dysrhythmia    Collection Time: 07/25/25  9:20 AM   Result Value Ref Range    QT Interval 337 ms    QTC Interval 467 ms   Comprehensive Metabolic Panel    Collection Time: 07/25/25  9:35 AM    Specimen: Blood   Result Value Ref Range    Glucose 107 (H) 65 - 99 mg/dL    BUN 20.0 8.0 - 23.0 mg/dL    Creatinine 1.43 (H) 0.76 - 1.27 mg/dL    Sodium 139 136 - 145 mmol/L    Potassium 4.4 3.5 - 5.2 mmol/L    Chloride 104 98 - 107 mmol/L    CO2 26.1 22.0 - 29.0 mmol/L    Calcium 9.5 8.6 - 10.5 mg/dL    Total Protein 6.9 6.0 - 8.5 g/dL    Albumin 4.2 3.5 - 5.2 g/dL    ALT (SGPT) 56 (H) 1 - 41 U/L    AST (SGOT) 39 1 - 40 U/L    Alkaline Phosphatase 58 39 - 117 U/L    Total Bilirubin 0.6 0.0 - 1.2 mg/dL    Globulin 2.7 gm/dL    A/G Ratio 1.6 g/dL    BUN/Creatinine Ratio 14.0 7.0 - 25.0    Anion Gap 8.9 5.0 - 15.0 mmol/L    eGFR 56.1 (L) >60.0 mL/min/1.73   Magnesium    Collection Time: 07/25/25  9:35 AM    Specimen: Blood   Result Value Ref Range    Magnesium 2.3 1.6 - 2.4 mg/dL   Green Top (Gel)    Collection Time: 07/25/25  9:35 AM   Result Value Ref Range    Extra Tube Hold for add-ons.    Lavender Top    Collection Time: 07/25/25  9:35 AM   Result Value Ref Range    Extra Tube hold for add-on    Gold Top - SST    Collection Time: 07/25/25  9:35 AM   Result Value Ref Range    Extra Tube Hold for add-ons.    Light Blue Top     Collection Time: 07/25/25  9:35 AM   Result Value Ref Range    Extra Tube Hold for add-ons.    CBC Auto Differential    Collection Time: 07/25/25  9:35 AM    Specimen: Blood   Result Value Ref Range    WBC 6.04 3.40 - 10.80 10*3/mm3    RBC 5.05 4.14 - 5.80 10*6/mm3    Hemoglobin 15.7 13.0 - 17.7 g/dL    Hematocrit 45.9 37.5 - 51.0 %    MCV 90.9 79.0 - 97.0 fL    MCH 31.1 26.6 - 33.0 pg    MCHC 34.2 31.5 - 35.7 g/dL    RDW 12.3 12.3 - 15.4 %    RDW-SD 40.4 37.0 - 54.0 fl    MPV 10.3 6.0 - 12.0 fL    Platelets 206 140 - 450 10*3/mm3    Neutrophil % 54.3 42.7 - 76.0 %    Lymphocyte % 32.3 19.6 - 45.3 %    Monocyte % 11.1 5.0 - 12.0 %    Eosinophil % 1.5 0.3 - 6.2 %    Basophil % 0.5 0.0 - 1.5 %    Immature Grans % 0.3 0.0 - 0.5 %    Neutrophils, Absolute 3.28 1.70 - 7.00 10*3/mm3    Lymphocytes, Absolute 1.95 0.70 - 3.10 10*3/mm3    Monocytes, Absolute 0.67 0.10 - 0.90 10*3/mm3    Eosinophils, Absolute 0.09 0.00 - 0.40 10*3/mm3    Basophils, Absolute 0.03 0.00 - 0.20 10*3/mm3    Immature Grans, Absolute 0.02 0.00 - 0.05 10*3/mm3    nRBC 0.0 0.0 - 0.2 /100 WBC         RADIOLOGY  No Radiology Exams Resulted Within Past 24 Hours      MEDICATIONS GIVEN IN ER  Medications   sodium chloride 0.9 % flush 10 mL (has no administration in time range)   metoprolol tartrate (LOPRESSOR) tablet 25 mg (25 mg Oral Given 7/25/25 0955)         ORDERS PLACED DURING THIS VISIT:  Orders Placed This Encounter   Procedures    XR Chest 1 View    Fosters Draw    Comprehensive Metabolic Panel    Magnesium    High Sensitivity Troponin T    TSH Rfx On Abnormal To Free T4    CBC Auto Differential    NPO Diet NPO Type: Strict NPO    Undress & Gown    Continuous Pulse Oximetry    LCG (on-call MD unless specified)    Oxygen Therapy- Nasal Cannula; Titrate 1-6 LPM Per SpO2; 90 - 95%    ECG 12 Lead ED Triage Standing Order; Dysrhythmia    Insert Peripheral IV    CBC & Differential    Green Top (Gel)    Lavender Top    Gold Top - SST    Light Blue Top          OUTPATIENT MEDICATION MANAGEMENT:  Current Facility-Administered Medications Ordered in Epic   Medication Dose Route Frequency Provider Last Rate Last Admin    sodium chloride 0.9 % flush 10 mL  10 mL Intravenous PRN Justin Osullivan MD         Current Outpatient Medications Ordered in Epic   Medication Sig Dispense Refill    Adderall XR 20 MG 24 hr capsule Take 1 capsule by mouth Every Morning      Cariprazine HCl (VRAYLAR) 1.5 MG capsule capsule Take 1 capsule by mouth Daily.      colestipol (COLESTID) 1 g tablet Start with one pill daily and increase to 2 daily if no improvement after 2 weeks 60 tablet 11    Desvenlafaxine Succinate ER 25 MG tablet sustained-release 24 hour Take 1 tablet by mouth Daily.      tamsulosin (FLOMAX) 0.4 MG capsule 24 hr capsule Take 1 capsule by mouth Daily. 90 capsule 2         PROCEDURES  Procedures            PROGRESS, DATA ANALYSIS, CONSULTS, AND MEDICAL DECISION MAKING  All labs have been independently interpreted by me.  All radiology studies have been reviewed by me. All EKG's have been independently viewed and interpreted by me.  Discussion below represents my analysis of pertinent findings related to patient's condition, differential diagnosis, treatment plan and final disposition.    Differential diagnosis includes but is not limited to atrial fibrillation, electrolyte disturbance, hypothyroidism, valvular heart disease.      ED Course as of 07/25/25 1002   Fri Jul 25, 2025   0928 EKG independently interpreted by me    Time 9:20 AM  Atrial fibrillation 150  QRS-normal axis, normal QRS  ST, T wave-unremarkable  QRS-normal axis, normal QRS  When compared to 2015 A-fib is new. [DB]   0951 Will go ahead and treat rapid pulse with beta-blocker, 25 mg of metoprolol. [DB]   0952 [id number]-[id number] score is 0 putting patient low risk of stroke. [DB]   0959 I did discuss evaluation of this patient with Dr. Rasheed Bliss who is on-call for [facility].    We discussed  patient's testing and evaluation and she would like to have him transferred upstairs to the cardiac evaluation center for possible echocardiogram and further workup [DB]      ED Course User Index  [DB] Justin Osullivan MD             AS OF 10:02 EDT VITALS:    BP - 121/65  HR - (!) 126  TEMP - 97.6 °F (36.4 °C) (Oral)  O2 SATS - 98%    COMPLEXITY OF CARE  59-year-old male with history of hyperlipidemia, prediabetes and family history of heart disease presents with fatigue.  He was seen this morning by primary care and found to be in atrial fibrillation with rapid ventricular response.    I did independently interpret all ED testing notable for:  CBC with normal white blood count,  Chemistries notable for elevated serum creatinine of 1.43 Which is elevated from baseline and suggesting some mild renal failure.  Will treat with IV fluids  Chest x-ray without obvious acute disease  High-sensitivity troponin negative x 2    Patient treated in the ED with IV fluids for elevated serum creatinine and likely mild dehydration.  Given oral beta-blocker for rapid ventricular rate    After discussion with on-call cardiology, Dr. Bliss, patient was transferred to the cardiac clinic for further workup to include possible echocardiogram and treatment for new onset atrial fibrillation.      DIAGNOSIS  Final diagnoses:   Atrial fibrillation with rapid ventricular response         DISPOSITION  ED Disposition       ED Disposition   Send to Specialty Department    Condition   --    Comment   Cardiac evaluation center                Please note that portions of this document were completed with a voice recognition program.    Note Disclaimer: At [facility], we believe that sharing information builds trust and better relationships. You are receiving this note because you recently visited [facility]. It is possible you will see health information before a provider has talked with you about it. This kind of information can be easy to  misunderstand. To help you fully understand what it means for your health, we urge you to discuss this note with your provider.         Justin Osullivan MD  07/25/25 1044

## 2025-07-25 NOTE — ASSESSMENT & PLAN NOTE
Continues on colestid.   Following with GI  Recommend avoiding trigger foods  Recommended trial of jorge's colon health probiotic

## 2025-07-26 LAB
QT INTERVAL: 337 MS
QTC INTERVAL: 467 MS

## 2025-07-30 ENCOUNTER — PATIENT MESSAGE (OUTPATIENT)
Dept: INTERNAL MEDICINE | Facility: CLINIC | Age: 60
End: 2025-07-30
Payer: COMMERCIAL

## 2025-08-12 ENCOUNTER — LAB (OUTPATIENT)
Facility: HOSPITAL | Age: 60
End: 2025-08-12
Payer: COMMERCIAL

## 2025-08-12 DIAGNOSIS — Z78.9 MEASLES, MUMPS, RUBELLA (MMR) VACCINATION STATUS UNKNOWN: ICD-10-CM

## 2025-08-12 DIAGNOSIS — Z12.5 SCREENING PSA (PROSTATE SPECIFIC ANTIGEN): ICD-10-CM

## 2025-08-12 DIAGNOSIS — E78.2 MIXED HYPERLIPIDEMIA: Chronic | ICD-10-CM

## 2025-08-12 DIAGNOSIS — R73.03 PREDIABETES: Chronic | ICD-10-CM

## 2025-08-12 LAB
CHOLEST SERPL-MCNC: 170 MG/DL (ref 0–200)
HBA1C MFR BLD: 6.4 % (ref 4.8–5.6)
HDLC SERPL-MCNC: 39 MG/DL (ref 40–60)
LDLC SERPL CALC-MCNC: 119 MG/DL (ref 0–100)
LDLC/HDLC SERPL: 3.04 {RATIO}
PSA SERPL-MCNC: 1.25 NG/ML (ref 0–4)
TRIGL SERPL-MCNC: 63 MG/DL (ref 0–150)
VLDLC SERPL-MCNC: 12 MG/DL (ref 5–40)

## 2025-08-12 PROCEDURE — 80061 LIPID PANEL: CPT

## 2025-08-12 PROCEDURE — 36415 COLL VENOUS BLD VENIPUNCTURE: CPT

## 2025-08-12 PROCEDURE — G0103 PSA SCREENING: HCPCS

## 2025-08-12 PROCEDURE — 86762 RUBELLA ANTIBODY: CPT

## 2025-08-12 PROCEDURE — 83695 ASSAY OF LIPOPROTEIN(A): CPT

## 2025-08-12 PROCEDURE — 83036 HEMOGLOBIN GLYCOSYLATED A1C: CPT

## 2025-08-12 PROCEDURE — 86765 RUBEOLA ANTIBODY: CPT

## 2025-08-12 PROCEDURE — 86735 MUMPS ANTIBODY: CPT

## 2025-08-13 LAB
LPA SERPL-SCNC: 163.9 NMOL/L
MEV IGG SER IA-ACNC: >300 AU/ML
MUV IGG SER IA-ACNC: 13.5 AU/ML
RUBV IGG SERPL IA-ACNC: 3.01 INDEX

## 2025-08-14 ENCOUNTER — PATIENT MESSAGE (OUTPATIENT)
Dept: INTERNAL MEDICINE | Facility: CLINIC | Age: 60
End: 2025-08-14
Payer: COMMERCIAL

## 2025-08-14 DIAGNOSIS — E78.2 MIXED HYPERLIPIDEMIA: Primary | ICD-10-CM

## 2025-08-15 RX ORDER — ATORVASTATIN CALCIUM 20 MG/1
20 TABLET, FILM COATED ORAL DAILY
Qty: 90 TABLET | Refills: 1 | Status: SHIPPED | OUTPATIENT
Start: 2025-08-15

## (undated) DEVICE — LN SMPL CO2 SHTRM SD STREAM W/M LUER

## (undated) DEVICE — TUBING, SUCTION, 1/4" X 10', STRAIGHT: Brand: MEDLINE

## (undated) DEVICE — GOWN SURG AERO CHROME XL

## (undated) DEVICE — KT ORCA ORCAPOD DISP STRL

## (undated) DEVICE — ADAPT CLN BIOGUARD AIR/H2O DISP

## (undated) DEVICE — SENSR O2 OXIMAX FNGR A/ 18IN NONSTR

## (undated) DEVICE — SINGLE-USE BIOPSY FORCEPS: Brand: RADIAL JAW 4

## (undated) DEVICE — SNAR POLYP CAPTIVATOR CRSNT 27MM 240CM

## (undated) DEVICE — GOWN,SIRUS,NON REINFRCD,LARGE,SET IN SL: Brand: MEDLINE

## (undated) DEVICE — CANN O2 ETCO2 FITS ALL CONN CO2 SMPL A/ 7IN DISP LF

## (undated) DEVICE — THE SINGLE USE ETRAP – POLYP TRAP IS USED FOR SUCTION RETRIEVAL OF ENDOSCOPICALLY REMOVED POLYPS.: Brand: ETRAP